# Patient Record
Sex: MALE | Race: WHITE | NOT HISPANIC OR LATINO | Employment: OTHER | URBAN - METROPOLITAN AREA
[De-identification: names, ages, dates, MRNs, and addresses within clinical notes are randomized per-mention and may not be internally consistent; named-entity substitution may affect disease eponyms.]

---

## 2017-05-28 ENCOUNTER — HOSPITAL ENCOUNTER (INPATIENT)
Facility: HOSPITAL | Age: 77
LOS: 2 days | Discharge: HOME | DRG: 281 | End: 2017-05-30
Attending: EMERGENCY MEDICINE | Admitting: INTERNAL MEDICINE
Payer: MEDICARE

## 2017-05-28 DIAGNOSIS — I21.4 NSTEMI, INITIAL EPISODE OF CARE: Primary | ICD-10-CM

## 2017-05-28 DIAGNOSIS — R07.9 CHEST PAIN: ICD-10-CM

## 2017-05-28 DIAGNOSIS — I21.4 NSTEMI (NON-ST ELEVATED MYOCARDIAL INFARCTION): ICD-10-CM

## 2017-05-28 LAB
ALBUMIN SERPL BCP-MCNC: 3.5 G/DL
ALP SERPL-CCNC: 75 U/L
ALT SERPL W/O P-5'-P-CCNC: 34 U/L
ANION GAP SERPL CALC-SCNC: 12 MMOL/L
APTT BLDCRRT: 24.7 SEC
AST SERPL-CCNC: 37 U/L
BASOPHILS # BLD AUTO: 0.01 K/UL
BASOPHILS # BLD AUTO: 0.03 K/UL
BASOPHILS NFR BLD: 0.1 %
BASOPHILS NFR BLD: 0.3 %
BILIRUB SERPL-MCNC: 0.5 MG/DL
BNP SERPL-MCNC: <10 PG/ML
BUN SERPL-MCNC: 27 MG/DL
CALCIUM SERPL-MCNC: 9.4 MG/DL
CHLORIDE SERPL-SCNC: 100 MMOL/L
CO2 SERPL-SCNC: 28 MMOL/L
CREAT SERPL-MCNC: 1.4 MG/DL
DIFFERENTIAL METHOD: ABNORMAL
DIFFERENTIAL METHOD: NORMAL
EOSINOPHIL # BLD AUTO: 0.2 K/UL
EOSINOPHIL # BLD AUTO: 0.4 K/UL
EOSINOPHIL NFR BLD: 1.6 %
EOSINOPHIL NFR BLD: 3.9 %
ERYTHROCYTE [DISTWIDTH] IN BLOOD BY AUTOMATED COUNT: 13.9 %
ERYTHROCYTE [DISTWIDTH] IN BLOOD BY AUTOMATED COUNT: 13.9 %
EST. GFR  (AFRICAN AMERICAN): 56.8 ML/MIN/1.73 M^2
EST. GFR  (NON AFRICAN AMERICAN): 49.1 ML/MIN/1.73 M^2
GLUCOSE SERPL-MCNC: 113 MG/DL
HCT VFR BLD AUTO: 46 %
HCT VFR BLD AUTO: 48.7 %
HGB BLD-MCNC: 15.7 G/DL
HGB BLD-MCNC: 17 G/DL
LYMPHOCYTES # BLD AUTO: 2.1 K/UL
LYMPHOCYTES # BLD AUTO: 3.6 K/UL
LYMPHOCYTES NFR BLD: 20 %
LYMPHOCYTES NFR BLD: 36 %
MCH RBC QN AUTO: 30.4 PG
MCH RBC QN AUTO: 30.8 PG
MCHC RBC AUTO-ENTMCNC: 34.1 %
MCHC RBC AUTO-ENTMCNC: 34.9 %
MCV RBC AUTO: 88 FL
MCV RBC AUTO: 89 FL
MONOCYTES # BLD AUTO: 0.6 K/UL
MONOCYTES # BLD AUTO: 1.1 K/UL
MONOCYTES NFR BLD: 10.6 %
MONOCYTES NFR BLD: 5.4 %
NEUTROPHILS # BLD AUTO: 5 K/UL
NEUTROPHILS # BLD AUTO: 7.6 K/UL
NEUTROPHILS NFR BLD: 49 %
NEUTROPHILS NFR BLD: 72.7 %
PLATELET # BLD AUTO: 170 K/UL
PLATELET # BLD AUTO: 181 K/UL
PMV BLD AUTO: 10 FL
PMV BLD AUTO: 9.5 FL
POTASSIUM SERPL-SCNC: 3.4 MMOL/L
PROT SERPL-MCNC: 7.3 G/DL
RBC # BLD AUTO: 5.16 M/UL
RBC # BLD AUTO: 5.52 M/UL
SODIUM SERPL-SCNC: 140 MMOL/L
TROPONIN I SERPL DL<=0.01 NG/ML-MCNC: 0.03 NG/ML
TROPONIN I SERPL DL<=0.01 NG/ML-MCNC: 0.11 NG/ML
TROPONIN I SERPL DL<=0.01 NG/ML-MCNC: 0.22 NG/ML
WBC # BLD AUTO: 10.1 K/UL
WBC # BLD AUTO: 10.45 K/UL

## 2017-05-28 PROCEDURE — 96375 TX/PRO/DX INJ NEW DRUG ADDON: CPT

## 2017-05-28 PROCEDURE — 83880 ASSAY OF NATRIURETIC PEPTIDE: CPT

## 2017-05-28 PROCEDURE — 63600175 PHARM REV CODE 636 W HCPCS: Performed by: EMERGENCY MEDICINE

## 2017-05-28 PROCEDURE — 25000003 PHARM REV CODE 250

## 2017-05-28 PROCEDURE — 93010 ELECTROCARDIOGRAM REPORT: CPT | Mod: 77,ICN,, | Performed by: INTERNAL MEDICINE

## 2017-05-28 PROCEDURE — 25000003 PHARM REV CODE 250: Performed by: EMERGENCY MEDICINE

## 2017-05-28 PROCEDURE — 36415 COLL VENOUS BLD VENIPUNCTURE: CPT

## 2017-05-28 PROCEDURE — 93005 ELECTROCARDIOGRAM TRACING: CPT

## 2017-05-28 PROCEDURE — 99285 EMERGENCY DEPT VISIT HI MDM: CPT | Mod: ,,, | Performed by: EMERGENCY MEDICINE

## 2017-05-28 PROCEDURE — 20600001 HC STEP DOWN PRIVATE ROOM

## 2017-05-28 PROCEDURE — 96365 THER/PROPH/DIAG IV INF INIT: CPT

## 2017-05-28 PROCEDURE — 25000003 PHARM REV CODE 250: Performed by: INTERNAL MEDICINE

## 2017-05-28 PROCEDURE — 85730 THROMBOPLASTIN TIME PARTIAL: CPT

## 2017-05-28 PROCEDURE — 93010 ELECTROCARDIOGRAM REPORT: CPT | Mod: ,,, | Performed by: INTERNAL MEDICINE

## 2017-05-28 PROCEDURE — 84484 ASSAY OF TROPONIN QUANT: CPT | Mod: 91

## 2017-05-28 PROCEDURE — 80053 COMPREHEN METABOLIC PANEL: CPT

## 2017-05-28 PROCEDURE — 99285 EMERGENCY DEPT VISIT HI MDM: CPT | Mod: 25

## 2017-05-28 PROCEDURE — 85025 COMPLETE CBC W/AUTO DIFF WBC: CPT

## 2017-05-28 RX ORDER — CLOPIDOGREL BISULFATE 75 MG/1
75 TABLET ORAL DAILY
Status: DISCONTINUED | OUTPATIENT
Start: 2017-05-29 | End: 2017-05-30 | Stop reason: HOSPADM

## 2017-05-28 RX ORDER — CHLORTHALIDONE 25 MG/1
25 TABLET ORAL DAILY
COMMUNITY

## 2017-05-28 RX ORDER — PREGABALIN 100 MG/1
100 CAPSULE ORAL 2 TIMES DAILY
COMMUNITY

## 2017-05-28 RX ORDER — IRBESARTAN 300 MG/1
300 TABLET ORAL NIGHTLY
Status: DISCONTINUED | OUTPATIENT
Start: 2017-05-28 | End: 2017-05-30 | Stop reason: HOSPADM

## 2017-05-28 RX ORDER — ROSUVASTATIN CALCIUM 20 MG/1
20 TABLET, COATED ORAL DAILY
Status: ON HOLD | COMMUNITY
End: 2017-05-29

## 2017-05-28 RX ORDER — SODIUM CHLORIDE 0.9 % (FLUSH) 0.9 %
3 SYRINGE (ML) INJECTION EVERY 8 HOURS
Status: DISCONTINUED | OUTPATIENT
Start: 2017-05-28 | End: 2017-05-28

## 2017-05-28 RX ORDER — NITROGLYCERIN 0.4 MG/1
TABLET SUBLINGUAL
Status: COMPLETED
Start: 2017-05-28 | End: 2017-05-28

## 2017-05-28 RX ORDER — DULOXETIN HYDROCHLORIDE 60 MG/1
60 CAPSULE, DELAYED RELEASE ORAL DAILY
Status: DISCONTINUED | OUTPATIENT
Start: 2017-05-29 | End: 2017-05-30 | Stop reason: HOSPADM

## 2017-05-28 RX ORDER — CLOPIDOGREL BISULFATE 75 MG/1
75 TABLET ORAL DAILY
COMMUNITY

## 2017-05-28 RX ORDER — NITROGLYCERIN 0.4 MG/1
0.4 TABLET SUBLINGUAL EVERY 5 MIN PRN
Status: COMPLETED | OUTPATIENT
Start: 2017-05-28 | End: 2017-05-28

## 2017-05-28 RX ORDER — SODIUM CHLORIDE 0.9 % (FLUSH) 0.9 %
3 SYRINGE (ML) INJECTION EVERY 8 HOURS
Status: DISCONTINUED | OUTPATIENT
Start: 2017-05-28 | End: 2017-05-30 | Stop reason: HOSPADM

## 2017-05-28 RX ORDER — DULOXETIN HYDROCHLORIDE 60 MG/1
60 CAPSULE, DELAYED RELEASE ORAL DAILY
COMMUNITY

## 2017-05-28 RX ORDER — CHLORTHALIDONE 25 MG/1
25 TABLET ORAL DAILY
Status: DISCONTINUED | OUTPATIENT
Start: 2017-05-29 | End: 2017-05-30 | Stop reason: HOSPADM

## 2017-05-28 RX ORDER — ROSUVASTATIN CALCIUM 20 MG/1
40 TABLET, COATED ORAL DAILY
Status: DISCONTINUED | OUTPATIENT
Start: 2017-05-29 | End: 2017-05-30 | Stop reason: HOSPADM

## 2017-05-28 RX ORDER — POLYETHYLENE GLYCOL 3350 17 G/17G
17 POWDER, FOR SOLUTION ORAL DAILY PRN
Status: DISCONTINUED | OUTPATIENT
Start: 2017-05-28 | End: 2017-05-30 | Stop reason: HOSPADM

## 2017-05-28 RX ORDER — ONDANSETRON 2 MG/ML
4 INJECTION INTRAMUSCULAR; INTRAVENOUS EVERY 6 HOURS PRN
Status: DISCONTINUED | OUTPATIENT
Start: 2017-05-28 | End: 2017-05-30 | Stop reason: HOSPADM

## 2017-05-28 RX ORDER — METOPROLOL TARTRATE 25 MG/1
12.5 TABLET ORAL EVERY 6 HOURS
Status: DISCONTINUED | OUTPATIENT
Start: 2017-05-28 | End: 2017-05-30 | Stop reason: HOSPADM

## 2017-05-28 RX ORDER — HEPARIN SODIUM,PORCINE/D5W 25000/250
15 INTRAVENOUS SOLUTION INTRAVENOUS CONTINUOUS
Status: DISCONTINUED | OUTPATIENT
Start: 2017-05-28 | End: 2017-05-30 | Stop reason: HOSPADM

## 2017-05-28 RX ORDER — CELECOXIB 200 MG/1
200 CAPSULE ORAL 2 TIMES DAILY
COMMUNITY

## 2017-05-28 RX ORDER — LANSOPRAZOLE 30 MG/1
30 CAPSULE, DELAYED RELEASE ORAL DAILY
COMMUNITY

## 2017-05-28 RX ORDER — BUPROPION HYDROCHLORIDE 150 MG/1
150 TABLET, EXTENDED RELEASE ORAL 2 TIMES DAILY
COMMUNITY

## 2017-05-28 RX ORDER — HYDROMORPHONE HYDROCHLORIDE 1 MG/ML
1 INJECTION, SOLUTION INTRAMUSCULAR; INTRAVENOUS; SUBCUTANEOUS
Status: COMPLETED | OUTPATIENT
Start: 2017-05-28 | End: 2017-05-28

## 2017-05-28 RX ORDER — ASPIRIN 81 MG/1
81 TABLET ORAL DAILY
Status: DISCONTINUED | OUTPATIENT
Start: 2017-05-29 | End: 2017-05-30 | Stop reason: HOSPADM

## 2017-05-28 RX ORDER — CARVEDILOL 6.25 MG/1
6.25 TABLET ORAL 2 TIMES DAILY WITH MEALS
COMMUNITY

## 2017-05-28 RX ORDER — ASPIRIN 325 MG
325 TABLET ORAL
Status: COMPLETED | OUTPATIENT
Start: 2017-05-28 | End: 2017-05-28

## 2017-05-28 RX ORDER — NITROGLYCERIN 0.4 MG/1
0.4 TABLET SUBLINGUAL EVERY 5 MIN PRN
Status: DISCONTINUED | OUTPATIENT
Start: 2017-05-28 | End: 2017-05-30 | Stop reason: HOSPADM

## 2017-05-28 RX ORDER — PANTOPRAZOLE SODIUM 40 MG/1
40 TABLET, DELAYED RELEASE ORAL DAILY
Status: DISCONTINUED | OUTPATIENT
Start: 2017-05-29 | End: 2017-05-30 | Stop reason: HOSPADM

## 2017-05-28 RX ORDER — ROSUVASTATIN CALCIUM 20 MG/1
20 TABLET, COATED ORAL DAILY
Status: DISCONTINUED | OUTPATIENT
Start: 2017-05-29 | End: 2017-05-28

## 2017-05-28 RX ORDER — LEVOTHYROXINE SODIUM 25 UG/1
50 TABLET ORAL
Status: DISCONTINUED | OUTPATIENT
Start: 2017-05-29 | End: 2017-05-30 | Stop reason: HOSPADM

## 2017-05-28 RX ORDER — BUPROPION HYDROCHLORIDE 150 MG/1
150 TABLET ORAL DAILY
Status: DISCONTINUED | OUTPATIENT
Start: 2017-05-29 | End: 2017-05-30 | Stop reason: HOSPADM

## 2017-05-28 RX ORDER — ACETAMINOPHEN 325 MG/1
650 TABLET ORAL EVERY 8 HOURS PRN
Status: DISCONTINUED | OUTPATIENT
Start: 2017-05-28 | End: 2017-05-30 | Stop reason: HOSPADM

## 2017-05-28 RX ORDER — CARVEDILOL 6.25 MG/1
6.25 TABLET ORAL 2 TIMES DAILY WITH MEALS
Status: DISCONTINUED | OUTPATIENT
Start: 2017-05-29 | End: 2017-05-28

## 2017-05-28 RX ORDER — PREGABALIN 100 MG/1
100 CAPSULE ORAL 2 TIMES DAILY
Status: DISCONTINUED | OUTPATIENT
Start: 2017-05-28 | End: 2017-05-30 | Stop reason: HOSPADM

## 2017-05-28 RX ORDER — IRBESARTAN 300 MG/1
300 TABLET ORAL NIGHTLY
COMMUNITY

## 2017-05-28 RX ORDER — ALFUZOSIN HYDROCHLORIDE 10 MG/1
10 TABLET, EXTENDED RELEASE ORAL
COMMUNITY

## 2017-05-28 RX ORDER — ALFUZOSIN HYDROCHLORIDE 10 MG/1
10 TABLET, EXTENDED RELEASE ORAL
Status: DISCONTINUED | OUTPATIENT
Start: 2017-05-29 | End: 2017-05-30 | Stop reason: HOSPADM

## 2017-05-28 RX ORDER — HYDROMORPHONE HYDROCHLORIDE 1 MG/ML
1 INJECTION, SOLUTION INTRAMUSCULAR; INTRAVENOUS; SUBCUTANEOUS EVERY 4 HOURS PRN
Status: DISCONTINUED | OUTPATIENT
Start: 2017-05-28 | End: 2017-05-30 | Stop reason: HOSPADM

## 2017-05-28 RX ORDER — POTASSIUM CHLORIDE 750 MG/1
10 CAPSULE, EXTENDED RELEASE ORAL DAILY
COMMUNITY

## 2017-05-28 RX ORDER — LEVOTHYROXINE SODIUM 50 UG/1
50 TABLET ORAL DAILY
COMMUNITY

## 2017-05-28 RX ADMIN — HEPARIN SODIUM AND DEXTROSE 15 UNITS/KG/HR: 10000; 5 INJECTION INTRAVENOUS at 09:05

## 2017-05-28 RX ADMIN — NITROGLYCERIN 0.4 MG: 0.4 TABLET SUBLINGUAL at 10:05

## 2017-05-28 RX ADMIN — NITROGLYCERIN 10 MG: 0.4 TABLET SUBLINGUAL at 07:05

## 2017-05-28 RX ADMIN — NITROGLYCERIN 0.4 MG: 0.4 TABLET SUBLINGUAL at 07:05

## 2017-05-28 RX ADMIN — Medication 12.5 MG: at 11:05

## 2017-05-28 RX ADMIN — PREGABALIN 100 MG: 100 CAPSULE ORAL at 11:05

## 2017-05-28 RX ADMIN — ASPIRIN 325 MG ORAL TABLET 325 MG: 325 PILL ORAL at 07:05

## 2017-05-28 RX ADMIN — HYDROMORPHONE HYDROCHLORIDE 1 MG: 1 INJECTION, SOLUTION INTRAMUSCULAR; INTRAVENOUS; SUBCUTANEOUS at 08:05

## 2017-05-28 RX ADMIN — ALUMINUM HYDROXIDE, MAGNESIUM HYDROXIDE, AND SIMETHICONE 50 ML: 200; 200; 20 SUSPENSION ORAL at 07:05

## 2017-05-28 RX ADMIN — Medication 3 ML: at 11:05

## 2017-05-28 RX ADMIN — IRBESARTAN 300 MG: 300 TABLET ORAL at 11:05

## 2017-05-28 NOTE — PROVIDER PROGRESS NOTES - EMERGENCY DEPT.
Encounter Date: 5/28/2017    ED Physician Progress Notes       SCRIBE NOTE: I, Benson Frank, am scribing for, and in the presence of,  Dr. Pederson.  Physician Statement: I, Dr. Pederson, personally performed the services described in this documentation as scribed by Benson Frank in my presence, and it is both accurate and complete.      EKG - STEMI Decision  Initial Reading: No STEMI present.

## 2017-05-28 NOTE — ED PROVIDER NOTES
Encounter Date: 5/28/2017       History     Chief Complaint   Patient presents with    Chest Pain     onset 45 minutes PTA- entire chest and back  with pain radiating to jaw.  Sweaty . sob . nauseated and dizzy - hx of   bypass       Review of patient's allergies indicates:   Allergen Reactions    Morphine      75 y/o M with PMHx as per below including CABG in 2005 who presented with CP radiating to jaw, associated with SOB and diaphoresis with sudden onset approximately 45 min PTA.  Sx's improved with sub lingual nitroglycerine x 2 in ED.  Pt states the pain is similar in nature to the CP he experienced prior to CABG in 2005.           Past Medical History:   Diagnosis Date    Coronary artery disease     GERD (gastroesophageal reflux disease)     Hypertension      Past Surgical History:   Procedure Laterality Date    CORONARY ARTERY BYPASS GRAFT      EYE SURGERY      HERNIA REPAIR      KNEE SURGERY      POSTERIOR FUSION LUMBAR SPINE W/ CORPECTOMY      SHOULDER SURGERY       History reviewed. No pertinent family history.  Social History   Substance Use Topics    Smoking status: Former Smoker    Smokeless tobacco: Not on file    Alcohol use No     Review of Systems   Constitutional: Negative for chills and fever.   HENT: Negative for congestion, postnasal drip, rhinorrhea, sinus pressure and sore throat.    Eyes: Negative for pain and redness.   Respiratory: Negative for apnea, cough, choking, chest tightness, shortness of breath, wheezing and stridor.    Cardiovascular: Positive for chest pain. Negative for palpitations and leg swelling.   Gastrointestinal: Negative for abdominal distention, abdominal pain, blood in stool, constipation, diarrhea, nausea and vomiting.   Genitourinary: Negative for dysuria, flank pain, hematuria and urgency.   Musculoskeletal: Negative for arthralgias and myalgias.   Skin: Negative for rash and wound.   Neurological: Negative for weakness, light-headedness and headaches.    Hematological: Does not bruise/bleed easily.   Psychiatric/Behavioral: Negative for agitation, behavioral problems, confusion and hallucinations. The patient is not nervous/anxious.        Physical Exam     Initial Vitals [05/28/17 1848]   BP Pulse Resp Temp SpO2   (!) 174/104 64 18 98.7 °F (37.1 °C) 97 %     Physical Exam    Nursing note and vitals reviewed.  Constitutional: He appears well-developed and well-nourished. He is not diaphoretic. No distress.   HENT:   Head: Normocephalic and atraumatic.   Right Ear: External ear normal.   Left Ear: External ear normal.   Mouth/Throat: Oropharynx is clear and moist.   Eyes: Right eye exhibits no discharge. Left eye exhibits no discharge. No scleral icterus.   Neck: No tracheal deviation present. No JVD present.   Cardiovascular: Normal rate, regular rhythm, normal heart sounds and intact distal pulses. Exam reveals no gallop and no friction rub.    No murmur heard.  Pulmonary/Chest: Breath sounds normal. No stridor. No respiratory distress. He has no wheezes. He has no rhonchi. He has no rales. He exhibits no tenderness.   Abdominal: Soft. He exhibits no distension. There is no tenderness. There is no rebound and no guarding.   Musculoskeletal: Normal range of motion. He exhibits no edema or tenderness.   Neurological: He is alert and oriented to person, place, and time. He has normal strength.   Skin: Skin is warm and dry. Capillary refill takes less than 2 seconds. No rash noted. No erythema.   Psychiatric: He has a normal mood and affect. His behavior is normal. Judgment and thought content normal.         ED Course   Procedures  Labs Reviewed   CBC W/ AUTO DIFFERENTIAL - Abnormal; Notable for the following:        Result Value    Mono # 1.1 (*)     All other components within normal limits   COMPREHENSIVE METABOLIC PANEL - Abnormal; Notable for the following:     Potassium 3.4 (*)     Glucose 113 (*)     BUN, Bld 27 (*)     eGFR if  56.8 (*)      eGFR if non  49.1 (*)     All other components within normal limits   TROPONIN I - Abnormal; Notable for the following:     Troponin I 0.029 (*)     All other components within normal limits   B-TYPE NATRIURETIC PEPTIDE   APTT   CBC W/ AUTO DIFFERENTIAL                   APC / Resident Notes:   Pt is A/O x 4, afebrile, non-toxic in appearance, in no acute respiratory distress with VSS.  Aspirin ordered immediately due to high level of concern for ACS.  EKG showed sinus bradycardia with no ischemic changes.  Initial troponin returned elevated so consulted to Cards and admitted for further evaluation and management due to high level of concern for ACS.  Pt remained clinically stable throughout his stay in the ED with no acute events.    Michael Palmer MD  PGY -  4  06/01/2017  6:30 PM                ED Course     Clinical Impression:   The primary encounter diagnosis was NSTEMI, initial episode of care. Diagnoses of Chest pain and NSTEMI (non-ST elevated myocardial infarction) were also pertinent to this visit.          Michael Palmer MD  Resident  06/01/17 5330       Michael Palmer MD  Resident  06/01/17 8724

## 2017-05-29 PROBLEM — K21.9 GERD (GASTROESOPHAGEAL REFLUX DISEASE): Status: ACTIVE | Noted: 2017-05-29

## 2017-05-29 PROBLEM — E03.9 HYPOTHYROIDISM: Status: ACTIVE | Noted: 2017-05-29

## 2017-05-29 PROBLEM — I10 HYPERTENSION: Status: ACTIVE | Noted: 2017-05-29

## 2017-05-29 PROBLEM — F39 MOOD DISORDER: Status: ACTIVE | Noted: 2017-05-29

## 2017-05-29 PROBLEM — R39.9 LOWER URINARY TRACT SYMPTOMS (LUTS): Status: ACTIVE | Noted: 2017-05-29

## 2017-05-29 PROBLEM — E87.6 HYPOKALEMIA: Status: ACTIVE | Noted: 2017-05-29

## 2017-05-29 PROBLEM — N17.9 AKI (ACUTE KIDNEY INJURY): Status: ACTIVE | Noted: 2017-05-29

## 2017-05-29 PROBLEM — G47.33 OSA ON CPAP: Status: ACTIVE | Noted: 2017-05-29

## 2017-05-29 PROBLEM — I21.4 NSTEMI (NON-ST ELEVATED MYOCARDIAL INFARCTION): Status: ACTIVE | Noted: 2017-05-29

## 2017-05-29 PROBLEM — N28.9 RENAL INSUFFICIENCY: Status: ACTIVE | Noted: 2017-05-29

## 2017-05-29 LAB
ALBUMIN SERPL BCP-MCNC: 3.4 G/DL
ALP SERPL-CCNC: 68 U/L
ALT SERPL W/O P-5'-P-CCNC: 46 U/L
ANION GAP SERPL CALC-SCNC: 9 MMOL/L
ANION GAP SERPL CALC-SCNC: 9 MMOL/L
AST SERPL-CCNC: 171 U/L
BASOPHILS # BLD AUTO: 0.02 K/UL
BASOPHILS NFR BLD: 0.2 %
BILIRUB SERPL-MCNC: 0.7 MG/DL
BUN SERPL-MCNC: 17 MG/DL
BUN SERPL-MCNC: 22 MG/DL
CALCIUM SERPL-MCNC: 8.9 MG/DL
CALCIUM SERPL-MCNC: 9 MG/DL
CHLORIDE SERPL-SCNC: 103 MMOL/L
CHLORIDE SERPL-SCNC: 97 MMOL/L
CHOLEST/HDLC SERPL: 4.4 {RATIO}
CO2 SERPL-SCNC: 24 MMOL/L
CO2 SERPL-SCNC: 30 MMOL/L
CREAT SERPL-MCNC: 1 MG/DL
CREAT SERPL-MCNC: 1 MG/DL
DIASTOLIC DYSFUNCTION: YES
DIFFERENTIAL METHOD: NORMAL
EOSINOPHIL # BLD AUTO: 0.3 K/UL
EOSINOPHIL NFR BLD: 2.5 %
ERYTHROCYTE [DISTWIDTH] IN BLOOD BY AUTOMATED COUNT: 14.1 %
EST. GFR  (AFRICAN AMERICAN): >60 ML/MIN/1.73 M^2
EST. GFR  (AFRICAN AMERICAN): >60 ML/MIN/1.73 M^2
EST. GFR  (NON AFRICAN AMERICAN): >60 ML/MIN/1.73 M^2
EST. GFR  (NON AFRICAN AMERICAN): >60 ML/MIN/1.73 M^2
ESTIMATED PA SYSTOLIC PRESSURE: 24.21
FACT X PPP CHRO-ACNC: 0.45 IU/ML
GLUCOSE SERPL-MCNC: 107 MG/DL
GLUCOSE SERPL-MCNC: 117 MG/DL (ref 70–110)
GLUCOSE SERPL-MCNC: 122 MG/DL
HCO3 UR-SCNC: 28.3 MMOL/L (ref 24–28)
HCT VFR BLD AUTO: 47.8 %
HCT VFR BLD CALC: 50 %PCV (ref 36–54)
HDL/CHOLESTEROL RATIO: 22.9 %
HDLC SERPL-MCNC: 118 MG/DL
HDLC SERPL-MCNC: 27 MG/DL
HGB BLD-MCNC: 16.2 G/DL
LDLC SERPL CALC-MCNC: 73.8 MG/DL
LYMPHOCYTES # BLD AUTO: 2.8 K/UL
LYMPHOCYTES NFR BLD: 25.2 %
MAGNESIUM SERPL-MCNC: 2 MG/DL
MCH RBC QN AUTO: 30.3 PG
MCHC RBC AUTO-ENTMCNC: 33.9 %
MCV RBC AUTO: 89 FL
MITRAL VALVE REGURGITATION: ABNORMAL
MONOCYTES # BLD AUTO: 0.9 K/UL
MONOCYTES NFR BLD: 8.2 %
NEUTROPHILS # BLD AUTO: 7.2 K/UL
NEUTROPHILS NFR BLD: 63.7 %
NONHDLC SERPL-MCNC: 91 MG/DL
PCO2 BLDA: 45.3 MMHG (ref 35–45)
PH SMN: 7.4 [PH] (ref 7.35–7.45)
PLATELET # BLD AUTO: 178 K/UL
PMV BLD AUTO: 10.2 FL
PO2 BLDA: 53 MMHG (ref 40–60)
POC BE: 4 MMOL/L
POC IONIZED CALCIUM: 1.15 MMOL/L (ref 1.06–1.42)
POC SATURATED O2: 87 % (ref 95–100)
POC TCO2: 30 MMOL/L (ref 24–29)
POTASSIUM BLD-SCNC: 2.9 MMOL/L (ref 3.5–5.1)
POTASSIUM SERPL-SCNC: 2.8 MMOL/L
POTASSIUM SERPL-SCNC: 4.1 MMOL/L
PROT SERPL-MCNC: 6.8 G/DL
RBC # BLD AUTO: 5.35 M/UL
RETIRED EF AND QEF - SEE NOTES: 45 (ref 55–65)
SAMPLE: ABNORMAL
SODIUM BLD-SCNC: 139 MMOL/L (ref 136–145)
SODIUM SERPL-SCNC: 136 MMOL/L
SODIUM SERPL-SCNC: 136 MMOL/L
TRICUSPID VALVE REGURGITATION: ABNORMAL
TRIGL SERPL-MCNC: 86 MG/DL
TROPONIN I SERPL DL<=0.01 NG/ML-MCNC: 8.38 NG/ML
WBC # BLD AUTO: 11.22 K/UL

## 2017-05-29 PROCEDURE — 25000003 PHARM REV CODE 250: Performed by: EMERGENCY MEDICINE

## 2017-05-29 PROCEDURE — 20600001 HC STEP DOWN PRIVATE ROOM

## 2017-05-29 PROCEDURE — 80061 LIPID PANEL: CPT

## 2017-05-29 PROCEDURE — 85025 COMPLETE CBC W/AUTO DIFF WBC: CPT

## 2017-05-29 PROCEDURE — 25000003 PHARM REV CODE 250: Performed by: INTERNAL MEDICINE

## 2017-05-29 PROCEDURE — 84484 ASSAY OF TROPONIN QUANT: CPT

## 2017-05-29 PROCEDURE — 80053 COMPREHEN METABOLIC PANEL: CPT

## 2017-05-29 PROCEDURE — 36415 COLL VENOUS BLD VENIPUNCTURE: CPT

## 2017-05-29 PROCEDURE — 27000190 HC CPAP FULL FACE MASK W/VALVE

## 2017-05-29 PROCEDURE — 27000221 HC OXYGEN, UP TO 24 HOURS

## 2017-05-29 PROCEDURE — 83735 ASSAY OF MAGNESIUM: CPT

## 2017-05-29 PROCEDURE — C1760 CLOSURE DEV, VASC: HCPCS

## 2017-05-29 PROCEDURE — 93459 L HRT ART/GRFT ANGIO: CPT | Mod: 26,,, | Performed by: INTERNAL MEDICINE

## 2017-05-29 PROCEDURE — C1769 GUIDE WIRE: HCPCS

## 2017-05-29 PROCEDURE — 99152 MOD SED SAME PHYS/QHP 5/>YRS: CPT | Mod: ,,, | Performed by: INTERNAL MEDICINE

## 2017-05-29 PROCEDURE — 93306 TTE W/DOPPLER COMPLETE: CPT

## 2017-05-29 PROCEDURE — 99233 SBSQ HOSP IP/OBS HIGH 50: CPT | Mod: ,,, | Performed by: INTERNAL MEDICINE

## 2017-05-29 PROCEDURE — 93306 TTE W/DOPPLER COMPLETE: CPT | Mod: 26,,, | Performed by: INTERNAL MEDICINE

## 2017-05-29 PROCEDURE — 63600175 PHARM REV CODE 636 W HCPCS

## 2017-05-29 PROCEDURE — B2111ZZ FLUOROSCOPY OF MULTIPLE CORONARY ARTERIES USING LOW OSMOLAR CONTRAST: ICD-10-PCS | Performed by: INTERNAL MEDICINE

## 2017-05-29 PROCEDURE — 99222 1ST HOSP IP/OBS MODERATE 55: CPT | Mod: ,,, | Performed by: INTERNAL MEDICINE

## 2017-05-29 PROCEDURE — 99900035 HC TECH TIME PER 15 MIN (STAT)

## 2017-05-29 PROCEDURE — 94660 CPAP INITIATION&MGMT: CPT

## 2017-05-29 PROCEDURE — 85520 HEPARIN ASSAY: CPT

## 2017-05-29 PROCEDURE — 80048 BASIC METABOLIC PNL TOTAL CA: CPT

## 2017-05-29 PROCEDURE — 25000003 PHARM REV CODE 250

## 2017-05-29 RX ORDER — ROSUVASTATIN CALCIUM 40 MG/1
40 TABLET, COATED ORAL DAILY
Qty: 30 TABLET | Refills: 0 | Status: SHIPPED | OUTPATIENT
Start: 2017-05-29

## 2017-05-29 RX ORDER — POTASSIUM CHLORIDE 20 MEQ/15ML
80 SOLUTION ORAL ONCE
Status: COMPLETED | OUTPATIENT
Start: 2017-05-29 | End: 2017-05-29

## 2017-05-29 RX ORDER — NITROGLYCERIN 0.4 MG/1
0.4 TABLET SUBLINGUAL EVERY 5 MIN PRN
Qty: 30 TABLET | Refills: 0 | Status: SHIPPED | OUTPATIENT
Start: 2017-05-29 | End: 2018-05-29

## 2017-05-29 RX ORDER — POTASSIUM CHLORIDE 20 MEQ/15ML
20 SOLUTION ORAL ONCE
Status: COMPLETED | OUTPATIENT
Start: 2017-05-29 | End: 2017-05-29

## 2017-05-29 RX ORDER — ASPIRIN 81 MG/1
81 TABLET ORAL DAILY
Refills: 0 | COMMUNITY
Start: 2017-05-29 | End: 2018-05-29

## 2017-05-29 RX ORDER — SODIUM CHLORIDE 9 MG/ML
3 INJECTION, SOLUTION INTRAVENOUS CONTINUOUS
Status: ACTIVE | OUTPATIENT
Start: 2017-05-29 | End: 2017-05-29

## 2017-05-29 RX ORDER — POTASSIUM CHLORIDE 20 MEQ/15ML
60 SOLUTION ORAL DAILY
Status: DISCONTINUED | OUTPATIENT
Start: 2017-05-29 | End: 2017-05-30 | Stop reason: HOSPADM

## 2017-05-29 RX ORDER — POTASSIUM CHLORIDE 7.45 MG/ML
20 INJECTION INTRAVENOUS ONCE
Status: DISCONTINUED | OUTPATIENT
Start: 2017-05-29 | End: 2017-05-29

## 2017-05-29 RX ADMIN — ASPIRIN 81 MG: 81 TABLET, COATED ORAL at 08:05

## 2017-05-29 RX ADMIN — POTASSIUM CHLORIDE 20 MEQ: 20 SOLUTION ORAL at 02:05

## 2017-05-29 RX ADMIN — Medication 12.5 MG: at 08:05

## 2017-05-29 RX ADMIN — Medication 12.5 MG: at 02:05

## 2017-05-29 RX ADMIN — POTASSIUM CHLORIDE 80 MEQ: 1.5 SOLUTION ORAL at 11:05

## 2017-05-29 RX ADMIN — LEVOTHYROXINE SODIUM 50 MCG: 25 TABLET ORAL at 06:05

## 2017-05-29 RX ADMIN — SODIUM CHLORIDE 3 ML/KG/HR: 0.9 INJECTION, SOLUTION INTRAVENOUS at 03:05

## 2017-05-29 RX ADMIN — HEPARIN SODIUM AND DEXTROSE 15 UNITS/KG/HR: 10000; 5 INJECTION INTRAVENOUS at 08:05

## 2017-05-29 RX ADMIN — CLOPIDOGREL 75 MG: 75 TABLET, FILM COATED ORAL at 08:05

## 2017-05-29 RX ADMIN — PREGABALIN 100 MG: 100 CAPSULE ORAL at 08:05

## 2017-05-29 RX ADMIN — ROSUVASTATIN CALCIUM 40 MG: 20 TABLET, FILM COATED ORAL at 08:05

## 2017-05-29 RX ADMIN — PANTOPRAZOLE SODIUM 40 MG: 40 TABLET, DELAYED RELEASE ORAL at 08:05

## 2017-05-29 RX ADMIN — IRBESARTAN 300 MG: 300 TABLET ORAL at 08:05

## 2017-05-29 RX ADMIN — POTASSIUM CHLORIDE 60 MEQ: 20 SOLUTION ORAL at 02:05

## 2017-05-29 RX ADMIN — BUPROPION HYDROCHLORIDE 150 MG: 150 TABLET, FILM COATED, EXTENDED RELEASE ORAL at 08:05

## 2017-05-29 RX ADMIN — Medication 3 ML: at 06:05

## 2017-05-29 RX ADMIN — DULOXETINE 60 MG: 60 CAPSULE, DELAYED RELEASE ORAL at 08:05

## 2017-05-29 RX ADMIN — ALFUZOSIN HYDROCHLORIDE 10 MG: 10 TABLET ORAL at 08:05

## 2017-05-29 RX ADMIN — CHLORTHALIDONE 25 MG: 25 TABLET ORAL at 08:05

## 2017-05-29 NOTE — ASSESSMENT & PLAN NOTE
No active chest pain or SOB. To undergo [facility].  1. Aspirin 81 mg daily.  2. Plavix 75 mg daily.  3. Rosuvastatin 40 mg daily.  4. Heparin drip pending [facility].  5. To [facility] today.

## 2017-05-29 NOTE — CONSULTS
[facility]  Cardiology  Consult Note    Patient Name: Malcom Mai  MRN: [id number]  Admission Date: 5/28/2017  Hospital Length of Stay: 0 days  Code Status: No Order   Attending Provider: Artem Pederson MD   Consulting Provider: Javy Draper MD  Primary Care Physician: [place], [place]  Principal Problem:<principal problem not specified>    Patient information was obtained from patient and spouse/SO.     Inpatient consult to Cardiology  Consult performed by: JAVY GARCIA  Consult ordered by: ANANT CORNELIUS           Subjective:     Chief Complaint:  Angina    HPI: 76 year old male patient with CAD( CABG x 4 in 2005 Dr Yip , [facility]), HTN, HLD, GERD, JOSE JUAN on CPAP presents to ED with sudden onset of chest pain 1 hour prior to presentation. Substernal radiating to jaw, associated with SOB and diaphoresis. Symptoms improved with sub lingual nitroglycerine x 2 in ED. He reports that these symptoms were similar to his presentation in 2005 that led to an angiogram and CABG subsequently. Patient does not remember any details of the procedure had a nuclear stress test 6 years ago that was reported as normal. He lives in [place], [place] and visiting his duaghter who lives in [place].     Past Medical History:   Diagnosis Date    Coronary artery disease     GERD (gastroesophageal reflux disease)     Hypertension        Past Surgical History:   Procedure Laterality Date    CORONARY ARTERY BYPASS GRAFT      EYE SURGERY      HERNIA REPAIR      KNEE SURGERY      POSTERIOR FUSION LUMBAR SPINE W/ CORPECTOMY      SHOULDER SURGERY         Review of patient's allergies indicates:   Allergen Reactions    Morphine        No current facility-administered medications on file prior to encounter.      No current outpatient prescriptions on file prior to encounter.     Family History     None        Social History Main Topics    Smoking status: Former Smoker     Smokeless tobacco: Not on file    Alcohol use No    Drug use: No    Sexual activity: Yes     Partners: Female     Review of Systems   Constitution: Positive for diaphoresis.   HENT: Negative for congestion and headaches.    Eyes: Negative for blurred vision and visual disturbance.   Cardiovascular: Positive for chest pain.   Respiratory: Positive for shortness of breath. Negative for sleep disturbances due to breathing.    Endocrine: Negative.    Skin: Negative.    Musculoskeletal: Negative for arthritis and neck pain.   Gastrointestinal: Positive for heartburn. Negative for abdominal pain.   Genitourinary: Negative.    Neurological: Negative for dizziness.   Psychiatric/Behavioral: Negative for depression. The patient is not nervous/anxious.      Objective:     Vital Signs (Most Recent):  Temp: 98.7 °F (37.1 °C) (05/28/17 1848)  Pulse: (!) 55 (05/28/17 1947)  Resp: (!) 21 (05/28/17 1947)  BP: 130/79 (05/28/17 1947)  SpO2: (!) 94 % (05/28/17 1947) Vital Signs (24h Range):  Temp:  [98.7 °F (37.1 °C)] 98.7 °F (37.1 °C)  Pulse:  [55-64] 55  Resp:  [15-25] 21  SpO2:  [93 %-97 %] 94 %  BP: (123-174)/() 130/79     Weight: 88.5 kg (195 lb)  Body mass index is 27.2 kg/m².    SpO2: (!) 94 %  O2 Device (Oxygen Therapy): room air    No intake or output data in the 24 hours ending 05/28/17 2049    Lines/Drains/Airways     Peripheral Intravenous Line                 Peripheral IV - Single Lumen 05/28/17 1900 Right Antecubital less than 1 day                Physical Exam   Constitutional: He is oriented to person, place, and time. He appears well-developed and well-nourished.   HENT:   Head: Normocephalic and atraumatic.   Eyes: Conjunctivae are normal. Pupils are equal, round, and reactive to light.   Neck: Normal range of motion. Neck supple.   Cardiovascular: Normal rate and regular rhythm.    Pulmonary/Chest: Effort normal and breath sounds normal.   Abdominal: Soft. Bowel sounds are normal.   Musculoskeletal: Normal  range of motion. He exhibits no edema.   Neurological: He is alert and oriented to person, place, and time.   Skin: Skin is warm and dry. No erythema.   Psychiatric: He has a normal mood and affect.       Significant Labs:   CMP   Recent Labs  Lab 05/28/17  1900      K 3.4*      CO2 28   *   BUN 27*   CREATININE 1.4   CALCIUM 9.4   PROT 7.3   ALBUMIN 3.5   BILITOT 0.5   ALKPHOS 75   AST 37   ALT 34   ANIONGAP 12   ESTGFRAFRICA 56.8*   EGFRNONAA 49.1*    and CBC   Recent Labs  Lab 05/28/17  1900   WBC 10.10   HGB 17.0   HCT 48.7            Assessment and Plan:   ACS : Unstable Angina/ NSTEMI. Patient has anginal symptoms similar to pre CABG in 2005. Initial troponin mildly elevated. He has been on DAPT with Plavix since 2005. Add heparin gtt. Continue DAPT/statin/beta blockers. Admit to IM-C. Patient symptom free at the time of my exam. If refractory - consider nitroglycerine infusion.  Interventional cardiology consult in AM    HTN : Continue ARB, beta blockers, chorthalidone.     Thank you for the consult.    Javy Draper MD  Cardiology   [facility]

## 2017-05-29 NOTE — PROGRESS NOTES
Pt heart rate 60; notified Dr. Braden Feliciano. Telephone order to administer metoprolol at 20:00 instead of 16:00 since 12:00 dose was given when pt returned from [facility] at 14:30. Metoprolol is prescribed q6h. Will continue to monitor.

## 2017-05-29 NOTE — ASSESSMENT & PLAN NOTE
Well controlled.   1. Chlorthalidone 25 mg daily.  2. Irbesartan 300 mg daily.  3. Lopressor 12.5 mg every 6 hours.  4. Monitor blood pressure and titrate therapy as needed.

## 2017-05-29 NOTE — ASSESSMENT & PLAN NOTE
- unclear baseline sCr  - obtain medical records from [place]  - rosa-procedural CIAKI precautions per INV cardiology

## 2017-05-29 NOTE — NURSING TRANSFER
Nursing Transfer Note      5/29/2017     Transfer To: [facility]    Transfer via stretcher    Transfer with cardiac monitoring    Transported by transport    Medicines sent: yes    Chart send with patient: Yes    Notified: spouse

## 2017-05-29 NOTE — ED TRIAGE NOTES
76 year old male pt presents to the ed with  Complaints of  Chest pain x 45 that started while watching tv. Pt denies any sob but complains of nausea. Pt denies any new numbness to his hands and feet. Pt is awake alert and oriented x 4. Pt does states he has some heart hx and presents to the ed with wife.

## 2017-05-29 NOTE — PROGRESS NOTES
Pt did not receive benadryl or have fluids started prior to leaving for [facility] because orders did not show; notified cath lab. Heparin stopped prior to procedure. Will continue to monitor.

## 2017-05-29 NOTE — CONSULTS
Interventional Cardiology Consult           Referring Physician:  Cinthia Feliciano MD  Indication: NSTEMI    HPI:     76 year old male patient with CAD( CABG x 4 in 2005 Dr Yip , [facility]), HTN, HLD, GERD, JOSE JUAN on CPAP presents to ED with sudden onset of chest pain 1 hour prior to presentation. Substernal radiating to jaw, associated with SOB and diaphoresis. Symptoms improved with sub lingual nitroglycerine x 2 in ED. He reports that these symptoms were similar to his presentation in 2005 that led to an angiogram and CABG subsequently. Patient does not remember any details of the procedure had a nuclear stress test 6 years ago that was reported as normal. He lives in [place], [place] and visiting his duaghter who lives in [place]      Past Medical History:   Diagnosis Date    Coronary artery disease     GERD (gastroesophageal reflux disease)     Hypertension      Past Surgical History:   Procedure Laterality Date    CORONARY ARTERY BYPASS GRAFT      EYE SURGERY      HERNIA REPAIR      KNEE SURGERY      POSTERIOR FUSION LUMBAR SPINE W/ CORPECTOMY      SHOULDER SURGERY       History reviewed. No pertinent family history.  Social History   Substance Use Topics    Smoking status: Former Smoker    Smokeless tobacco: Not on file    Alcohol use No     Review of patient's allergies indicates:   Allergen Reactions    Morphine       alfuzosin  10 mg Oral Daily with breakfast    aspirin  81 mg Oral Daily    buPROPion  150 mg Oral Daily    chlorthalidone  25 mg Oral Daily    clopidogrel  75 mg Oral Daily    duloxetine  60 mg Oral Daily    irbesartan  300 mg Oral QHS    levothyroxine  50 mcg Oral Before breakfast    metoprolol tartrate  12.5 mg Oral Q6H    pantoprazole  40 mg Oral Daily    pregabalin  100 mg Oral BID    rosuvastatin  40 mg Oral Daily    sodium chloride 0.9%  3 mL Intravenous Q8H       Review of Systems -   Constitution: Positive for diaphoresis.   HENT: Negative for  "congestion and headaches.    Eyes: Negative for blurred vision and visual disturbance.   Cardiovascular: Positive for chest pain.   Respiratory: Positive for shortness of breath. Negative for sleep disturbances due to breathing.    Endocrine: Negative.    Skin: Negative.    Musculoskeletal: Negative for arthritis and neck pain.   Gastrointestinal: Positive for heartburn. Negative for abdominal pain.   Genitourinary: Negative.    Neurological: Negative for dizziness.   Psychiatric/Behavioral: Negative for depression. The patient is not nervous/anxious  OBJECTIVE:     Vitals:    05/28/17 2202 05/28/17 2206 05/28/17 2245 05/28/17 2305   BP: (!) 141/82  (!) 150/93 100/62   Pulse:   71 75   Resp:  17 15 19   Temp:       TempSrc:       SpO2: 95%  95% (!) 92%   Weight:   86.9 kg (191 lb 9.3 oz)    Height:   5' 11" (1.803 m)          Constitutional: He is oriented to person, place, and time. He appears well-developed and well-nourished.   HENT:   Head: Normocephalic and atraumatic.   Eyes: Conjunctivae are normal. Pupils are equal, round, and reactive to light.   Neck: Normal range of motion. Neck supple.   Cardiovascular: Normal rate and regular rhythm.    Pulmonary/Chest: Effort normal and breath sounds normal.   Abdominal: Soft. Bowel sounds are normal.   Musculoskeletal: Normal range of motion. He exhibits no edema.   Neurological: He is alert and oriented to person, place, and time.   Skin: Skin is warm and dry. No erythema.   Psychiatric: He has a normal mood and affect.   Vascular:   LEFT RIGHT   RADIAL 2+ 2+   ULNAR 2+ 2+   BRACHIAL 2+ 2+   FEMORAL 2+ 2+   DP 2+ 2+   TP 2+ 2+     LABS:     CBC with Diff:     Recent Labs  Lab 05/28/17 1900 05/28/17 2148   WBC 10.10 10.45   HGB 17.0 15.7   HCT 48.7 46.0    170   LYMPH 36.0  3.6 20.0  2.1   MONO 10.6  1.1* 5.4  0.6   EOSINOPHIL 3.9 1.6       COAG:    Recent Labs  Lab 05/28/17 2148   APTT 24.7       CMP:   Recent Labs  Lab 05/28/17 1900   * "   CALCIUM 9.4   ALBUMIN 3.5   PROT 7.3      K 3.4*   CO2 28      BUN 27*   CREATININE 1.4   ALKPHOS 75   ALT 34   AST 37   BILITOT 0.5     Estimated Creatinine Clearance: 49.3 mL/min (based on Cr of 1.4).    .  Recent Labs  Lab 05/28/17  1900 05/28/17  2148 05/28/17  2311   TROPONINI 0.029* 0.111* 0.224*   BNP <10  --   --        IMAGING:   Telemetry:  NSR    EKGs: NSR, LAD, non specific ST-T changes    CXR: no acute findings    TTE: pending    Prior Ischemic Evaluation:  6 year sago at [place], [place] nuclear stress test  Had LHc and CABG in 2005 at [facility]   Medical records awaited.    PROCEDURAL RISK STRATIFICATION:     RAY Score: 6 with 41% risk at 14 days of: all-cause mortality, new or recurrent MI, or severe recurrent ischemia requiring urgent revascularization.    Stirum Predicted PCI Mortality Risk: 1.6    Stirum Predicted MACE Risk: 3.5    Crusade Bleeding Score & Risk: 25 low risk 5.7% risk of major in hospital bleeding    Contrast Nephropathy Post-PCI/Renal Risk Score: 7.5%   Prior Contrast Allergy: no    Sedation   Prior Sedation: yes   History of Obstructive Sleep Apnea/SOB: yes    Pertinent Allergies:   Latex: no   ASA: no   Heparin-Induced Thrombocytopenia: no    IMPRESSION / PLAN:     76 year old male patient with prior CABG in 2005 presenting with NSTEMI. On ACS Rx with DAPT( ASA/Plavix) , heparin infusion    The risks, benefits, and alternatives of coronary vascular angiography and possible intervention were discussed with pt. All questions were answered and informed consent was obtained. I had a detailed discussion with the patient regarding risk of stroke, MI, bleeding access site complications, renal failure, emergent need for heart surgery, acute limb complications including ischemia and loss, contrast allergy and death. Pt understands the risks and benefits of the procedure and wishes to proceed. If stents are needed and there is preference for PERRI, pt understands that would  necessitate aspirin for life with plavix for at least 1 year. Additionally, pt is aware that non compliance is likely to result in stent clotting with heart attack, heart failure, and/or death.     Right CFA access, JR/JL cath 4Fr, PERRI candidate.    Javy Draper MD  Cardiovascular Disease Fellow  PGY - V  Pager: [phone]

## 2017-05-29 NOTE — ASSESSMENT & PLAN NOTE
Patient with creatinine of 1.4 which down trended to 1. No other laboratory work up in our system to compare. Most consistent with MARY due to hypoperfusion from NSTEMI.  1. Monitor creatinine.  2. Avoid nephrotoxic agents.   3. Renally dose medications.

## 2017-05-29 NOTE — PROGRESS NOTES
"Interventional Cardiology   Post Cath Note      Referring Physician: Cinthia Feliciano MD  Procedure: [facility]  Indication: NSTEMI    SUBJECTIVE:   76 year old male patient with CAD( CABG x 4 in 2005 Dr Yip , [facility]), HTN, HLD, GERD, JOSE JUAN on CPAP presents to ED with sudden onset of chest pain 1 hour prior to presentation- diagnosed with NSTEMI    Cath Results:   Access:  Right CFA    LVEDP=15mmHg.    Patent LIMA to LAD.    Patent SVG to PDA.    Occluded SVG to OM.    Occluded SVG to D1.    99% proximal LAD stenosis.     of ostial LCx.     of PDA.    Right to left collaterals from PLB to LCx.  See full cath report for further details    Intervention:   None  Closure device used: Mynx  Patient tolerated the procedure well, no complications    Post Cath Exam:   /84 (BP Location: Left arm, BP Method: Automatic)   Pulse 69   Temp 98.2 °F (36.8 °C) (Oral)   Resp 18   Ht 5' 11" (1.803 m)   Wt 86.9 kg (191 lb 9.3 oz)   SpO2 (!) 93%   BMI 26.72 kg/m²     No unusual pain, hematoma, thrill or bruit at vascular access site.  Distal pulse present without signs of ischemia.    Vascular:   LEFT RIGHT   FEMORAL 2+ 2+   DP 2+ 2+   TP 2+ 2+       Assessment / Plan:     1. Routine post-cath care.  2. Maximize medical secondary prevention of CAD and MI. Better Blood pressure control. Baseline BP at home in 170s.  3. Risk factor reduction.   4. EC ASA 81mg po qday  5. Plavix 75mg po qday X 1 year  6. If angina persists despite maximally tolerated GDMT, then evaluate for D1 PCI and LCx  PCI  7. Follow-up with primary cardiologist in 1 week    Javy Draper MD  Cardiovascular Disease Fellow  PGY - V  Pager: [phone]    5/29/2017 2:37 PM  "

## 2017-05-29 NOTE — HOSPITAL COURSE
Patient admitted to Hospital Medicine Team C. He was evaluated by Interventional Cardiology and was scheduled for C.

## 2017-05-29 NOTE — PLAN OF CARE
05/29/17 1109   Discharge Assessment   Assessment Type Discharge Planning Assessment   Confirmed/corrected address and phone number on facesheet? Yes   Assessment information obtained from? Caregiver;Medical Record   Expected Length of Stay (days) 2   Communicated expected length of stay with patient/caregiver yes   Prior to hospitilization cognitive status: Alert/Oriented   Prior to hospitalization functional status: Independent   Current cognitive status: Alert/Oriented   Current Functional Status: Independent   Arrived From home or self-care   Lives With spouse   Able to Return to Prior Arrangements yes   Is patient able to care for self after discharge? Yes   How many people do you have in your home that can help with your care after discharge? 1   Who are your caregiver(s) and their phone number(s)? wife,Edilia   Patient's perception of discharge disposition home or selfcare   Readmission Within The Last 30 Days no previous admission in last 30 days   Patient currently being followed by outpatient case management? No   Patient currently receives home health services? No   Does the patient currently use HME? No   Patient currently receives private duty nursing? No   Patient currently receives any other outside agency services? No   Equipment Currently Used at Home CPAP   Do you have any problems affording any of your prescribed medications? No   Is the patient taking medications as prescribed? yes   Do you have any financial concerns preventing you from receiving the healthcare you need? No   Does the patient have transportation to healthcare appointments? Yes   Transportation Available car;family or friend will provide   On Dialysis? No   Does the patient receive services at the Coumadin Clinic? No   Are there any open cases? No   Discharge Plan A Home with family   Patient/Family In Agreement With Plan yes   Admitted with NSTEMI. Pt off the floor for Echo. Information obtained from wife. Lives with wife in   [place]. Independent in his ADLs. Plan is to DC home. No DC needs identified.    PCP:  [facility]: Brendan Matthews MD  Address: [address] [place] [address] [address], [place] [address]   Phone: [phone]

## 2017-05-29 NOTE — H&P
"[facility]  Cardiology  History and Physical     Patient Name: Malcom Mai  MRN: [id number]  Admission Date: 5/28/2017  Code Status: Full Code   Attending Provider: Cinthia Feliciano MD   Primary Care Physician: Primary Doctor No  Principal Problem:<principal problem not specified>    Patient information was obtained from patient and ER records.     Subjective:     Chief Complaint:  CP     HPI:  The patient developed back pain around 5P while watching TV and twenty min later developed constant CP across the chest. He had a "cold sweat" and nausea. The patient denies vomiting and SOB with the pain. The pain radiated to both arms and shoulders as well as to the left jaw. Over the last 3 weeks, he has had CP "once or twice a week" that he describes as sharp. The pain lasted "probably less than a minute". He denies fever, hemoptysis and changes with breathing or body position. The patient is a non-smoker. He follows with a cardiologist in [place]. The patient reportedly had a stress test about 4 years ago that was normal. He had CABG x4 in 2006. The patient also has a h/o upper GI issues such that he gets dilation every 4 years.      Past Medical History:   Diagnosis Date    Coronary artery disease     GERD (gastroesophageal reflux disease)     Hypertension        Past Surgical History:   Procedure Laterality Date    CORONARY ARTERY BYPASS GRAFT      EYE SURGERY      HERNIA REPAIR      KNEE SURGERY      POSTERIOR FUSION LUMBAR SPINE W/ CORPECTOMY      SHOULDER SURGERY         Review of patient's allergies indicates:   Allergen Reactions    Morphine        No current facility-administered medications on file prior to encounter.      No current outpatient prescriptions on file prior to encounter.     Family History     None        Social History Main Topics    Smoking status: Former Smoker    Smokeless tobacco: Not on file    Alcohol use No    Drug use: No    Sexual activity: Yes "     Partners: Female     Review of Systems   Constitution: Negative for chills and fever.   HENT: Negative for ear discharge and headaches.    Eyes: Negative for pain and visual disturbance.   Cardiovascular: Positive for chest pain. Negative for dyspnea on exertion, irregular heartbeat, leg swelling, orthopnea, palpitations, paroxysmal nocturnal dyspnea and syncope.   Respiratory: Negative for hemoptysis, shortness of breath and wheezing.    Skin: Negative for rash and suspicious lesions.   Musculoskeletal: Negative for joint pain and muscle weakness.   Gastrointestinal: Positive for nausea. Negative for abdominal pain, diarrhea, hematemesis, hematochezia, melena and vomiting.   Genitourinary: Negative for dysuria and frequency.   Neurological: Negative for focal weakness and tremors.   Psychiatric/Behavioral: Negative for altered mental status, suicidal ideas and thoughts of violence.     Objective:     Vital Signs (Most Recent):  Temp: 96.5 °F (35.8 °C) (05/29/17 0600)  Pulse: (!) 57 (05/29/17 0515)  Resp: (!) 8 (05/29/17 0515)  BP: (!) 141/88 (05/29/17 0515)  SpO2: 95 % (05/29/17 0515) Vital Signs (24h Range):  Temp:  [96.5 °F (35.8 °C)-98.7 °F (37.1 °C)] 96.5 °F (35.8 °C)  Pulse:  [54-75] 57  Resp:  [8-25] 8  SpO2:  [92 %-97 %] 95 %  BP: (100-174)/() 141/88     Weight: 86.9 kg (191 lb 9.3 oz)  Body mass index is 26.72 kg/m².    SpO2: 95 %  O2 Device (Oxygen Therapy): CPAP    No intake or output data in the 24 hours ending 05/29/17 0610    Lines/Drains/Airways     Peripheral Intravenous Line                 Peripheral IV - Single Lumen 05/28/17 1900 Right Antecubital less than 1 day         Peripheral IV - Single Lumen 05/28/17 2137 Left Forearm less than 1 day                Physical Exam   Constitutional: He is oriented to person, place, and time. He appears well-developed.   HENT:   Head: Normocephalic and atraumatic.   Eyes: EOM are normal.   Neck: Normal range of motion.   Cardiovascular: Normal rate,  regular rhythm and normal heart sounds.  Exam reveals no gallop and no friction rub.    No murmur heard.  Pulses:       Radial pulses are 2+ on the right side, and 2+ on the left side.   Pulmonary/Chest: Effort normal. He has no wheezes. He has no rales.   Abdominal: Soft. Bowel sounds are normal. He exhibits no distension. There is no tenderness. There is no rebound.   Musculoskeletal: Normal range of motion. He exhibits no edema.   Neurological: He is alert and oriented to person, place, and time. He has normal strength. No cranial nerve deficit or sensory deficit.   Skin: Skin is warm.   Psychiatric: He has a normal mood and affect.       Significant Labs:   CMP   Recent Labs  Lab 05/28/17  1900      K 3.4*      CO2 28   *   BUN 27*   CREATININE 1.4   CALCIUM 9.4   PROT 7.3   ALBUMIN 3.5   BILITOT 0.5   ALKPHOS 75   AST 37   ALT 34   ANIONGAP 12   ESTGFRAFRICA 56.8*   EGFRNONAA 49.1*   , CBC   Recent Labs  Lab 05/28/17 1900 05/28/17 2148   WBC 10.10 10.45   HGB 17.0 15.7   HCT 48.7 46.0    170    and Troponin   Recent Labs  Lab 05/28/17 1900 05/28/17 2148 05/28/17  2311   TROPONINI 0.029* 0.111* 0.224*       Significant Imaging: Reviewed    Assessment and Plan:     NSTEMI (non-ST elevated myocardial infarction)    - aspirin loaded; continue maintenance dose ASA  - compliant with clopidogrel; continue maintenance dose  - IV UFH, irbesartan, metoprolol tartrate (goal HR 50-60)  - no e/o electric or hemodynamic instability  - no e/o recurrent/refractory CP  - increase rosuvastatin dose  - INV cardiology consult  - TTE with CFD  - SL NTG prn  - lipid panel  - trend Tn        Renal insufficiency    - unclear baseline sCr  - obtain medical records from [place]  - rosa-procedural CIAKI precautions per INV cardiology        Hypertension    - chlorthalidone, irbetsartan  - metoprolol tartrate in lieu of carvedilol        JOSE JUAN on CPAP    - CPAP qHS        GERD (gastroesophageal reflux disease)     - pantoprazole        Hypothyroidism    - LT4        Mood disorder    - bupropion, duloxetine        Lower urinary tract symptoms (LUTS)    - alfuzosin            VTE Risk Mitigation         Ordered     Low Risk of VTE  Once      05/28/17 2940          Lm Feng MD  Cardiology   [facility]

## 2017-05-29 NOTE — NURSING TRANSFER
Nursing Transfer Note      5/28/2017     Transfer From: ED    Transfer via stretcher    Transfer with cardiac monitoring,     Transported by RN    Medicines sent: heparin gtt    Chart send with patient: Yes    Notified: spouse, daughter    Upon arrival to floor: cardiac monitor & visi monitor applied, patient oriented to room, call bell in reach and bed in lowest position, ipad given to pt. CP 3/10 on arrival. Dr. Feng at bedside. Nitro X2 given. CP now resolved. BP stable. NADN. Will continue to monitor.

## 2017-05-29 NOTE — NURSING TRANSFER
Nursing Transfer Note      5/29/2017     Transfer From: echo    Transfer via stretcher    Transfer with cardiac monitoring    Transported by transport    Medicines sent: yes    Chart send with patient: No    Notified: spouse    Patient reassessed at: 10:36 am; 5/29    Upon arrival to floor: cardiac monitor applied, patient oriented to room, call bell in reach and bed in lowest position

## 2017-05-29 NOTE — ASSESSMENT & PLAN NOTE
- aspirin loaded; continue maintenance dose ASA  - compliant with clopidogrel; continue maintenance dose  - IV UFH, irbesartan, metoprolol tartrate (goal HR 50-60)  - no e/o electric or hemodynamic instability  - no e/o recurrent/refractory CP  - increase rosuvastatin dose  - INV cardiology consult  - TTE with CFD  - SL NTG prn  - lipid panel  - trend Tn

## 2017-05-29 NOTE — NURSING TRANSFER
Nursing Transfer Note      5/29/2017     Transfer From: [facility]    Transfer via stretcher    Transfer with cardiac monitoring    Transported by cath lab transport    Medicines sent: yes    Chart send with patient: Yes    Notified: spouse, daughter    Patient reassessed at: 1355; 5/29    Upon arrival to floor: cardiac monitor applied, patient oriented to room, call bell in reach and bed in lowest position

## 2017-05-29 NOTE — PLAN OF CARE
Hypokalemia resolved after replacement. Medically stable for discharge but his family allege that patient is weak and unable to get up and walk. Will cancel discharge and arrange for PT/OT to evaluate in the morning.  Cinthia Feliciano MD  [facility] Medicine Staff  [phone]

## 2017-05-29 NOTE — SUBJECTIVE & OBJECTIVE
Past Medical History:   Diagnosis Date    Coronary artery disease     GERD (gastroesophageal reflux disease)     Hypertension        Past Surgical History:   Procedure Laterality Date    CORONARY ARTERY BYPASS GRAFT      EYE SURGERY      HERNIA REPAIR      KNEE SURGERY      POSTERIOR FUSION LUMBAR SPINE W/ CORPECTOMY      SHOULDER SURGERY         Review of patient's allergies indicates:   Allergen Reactions    Morphine        No current facility-administered medications on file prior to encounter.      No current outpatient prescriptions on file prior to encounter.     Family History     None        Social History Main Topics    Smoking status: Former Smoker    Smokeless tobacco: Not on file    Alcohol use No    Drug use: No    Sexual activity: Yes     Partners: Female     Review of Systems   Constitution: Negative for chills and fever.   HENT: Negative for ear discharge and headaches.    Eyes: Negative for pain and visual disturbance.   Cardiovascular: Positive for chest pain. Negative for dyspnea on exertion, irregular heartbeat, leg swelling, orthopnea, palpitations, paroxysmal nocturnal dyspnea and syncope.   Respiratory: Negative for hemoptysis, shortness of breath and wheezing.    Skin: Negative for rash and suspicious lesions.   Musculoskeletal: Negative for joint pain and muscle weakness.   Gastrointestinal: Positive for nausea. Negative for abdominal pain, diarrhea, hematemesis, hematochezia, melena and vomiting.   Genitourinary: Negative for dysuria and frequency.   Neurological: Negative for focal weakness and tremors.   Psychiatric/Behavioral: Negative for altered mental status, suicidal ideas and thoughts of violence.     Objective:     Vital Signs (Most Recent):  Temp: 96.5 °F (35.8 °C) (05/29/17 0600)  Pulse: (!) 57 (05/29/17 0515)  Resp: (!) 8 (05/29/17 0515)  BP: (!) 141/88 (05/29/17 0515)  SpO2: 95 % (05/29/17 0515) Vital Signs (24h Range):  Temp:  [96.5 °F (35.8 °C)-98.7 °F (37.1  °C)] 96.5 °F (35.8 °C)  Pulse:  [54-75] 57  Resp:  [8-25] 8  SpO2:  [92 %-97 %] 95 %  BP: (100-174)/() 141/88     Weight: 86.9 kg (191 lb 9.3 oz)  Body mass index is 26.72 kg/m².    SpO2: 95 %  O2 Device (Oxygen Therapy): CPAP    No intake or output data in the 24 hours ending 05/29/17 0610    Lines/Drains/Airways     Peripheral Intravenous Line                 Peripheral IV - Single Lumen 05/28/17 1900 Right Antecubital less than 1 day         Peripheral IV - Single Lumen 05/28/17 2137 Left Forearm less than 1 day                Physical Exam   Constitutional: He is oriented to person, place, and time. He appears well-developed.   HENT:   Head: Normocephalic and atraumatic.   Eyes: EOM are normal.   Neck: Normal range of motion.   Cardiovascular: Normal rate, regular rhythm and normal heart sounds.  Exam reveals no gallop and no friction rub.    No murmur heard.  Pulses:       Radial pulses are 2+ on the right side, and 2+ on the left side.   Pulmonary/Chest: Effort normal. He has no wheezes. He has no rales.   Abdominal: Soft. Bowel sounds are normal. He exhibits no distension. There is no tenderness. There is no rebound.   Musculoskeletal: Normal range of motion. He exhibits no edema.   Neurological: He is alert and oriented to person, place, and time. He has normal strength. No cranial nerve deficit or sensory deficit.   Skin: Skin is warm.   Psychiatric: He has a normal mood and affect.       Significant Labs:   CMP   Recent Labs  Lab 05/28/17 1900      K 3.4*      CO2 28   *   BUN 27*   CREATININE 1.4   CALCIUM 9.4   PROT 7.3   ALBUMIN 3.5   BILITOT 0.5   ALKPHOS 75   AST 37   ALT 34   ANIONGAP 12   ESTGFRAFRICA 56.8*   EGFRNONAA 49.1*   , CBC   Recent Labs  Lab 05/28/17 1900 05/28/17 2148   WBC 10.10 10.45   HGB 17.0 15.7   HCT 48.7 46.0    170    and Troponin   Recent Labs  Lab 05/28/17 1900 05/28/17 2148 05/28/17  2311   TROPONINI 0.029* 0.111* 0.224*       Significant  Imaging: Reviewed

## 2017-05-29 NOTE — SUBJECTIVE & OBJECTIVE
"Interval History: "OK. No more chest pain". No acute overnight events.    Review of Systems   Constitutional: Negative for chills, fatigue and fever.   HENT: Negative for ear pain, mouth sores, nosebleeds, postnasal drip, rhinorrhea, sinus pressure, sore throat, tinnitus, trouble swallowing and voice change.    Eyes: Negative for photophobia, pain, redness and visual disturbance.   Respiratory: Negative for apnea, cough, chest tightness, shortness of breath and wheezing.    Cardiovascular: Negative for chest pain, palpitations and leg swelling.   Gastrointestinal: Negative for abdominal pain, blood in stool, constipation, diarrhea, nausea and vomiting.   Endocrine: Negative for cold intolerance, heat intolerance, polydipsia and polyuria.   Genitourinary: Negative for decreased urine volume, difficulty urinating, discharge, dysuria, flank pain, frequency, genital sores, hematuria, penile pain, penile swelling, scrotal swelling, testicular pain and urgency.   Musculoskeletal: Negative for arthralgias, back pain, joint swelling, myalgias and neck pain.   Skin: Negative for color change and rash.   Allergic/Immunologic: Negative for environmental allergies and food allergies.   Neurological: Negative for dizziness, seizures, syncope, weakness, light-headedness, numbness and headaches.   Hematological: Negative for adenopathy. Does not bruise/bleed easily.   Psychiatric/Behavioral: Negative for agitation, confusion, decreased concentration, hallucinations, self-injury, sleep disturbance and suicidal ideas. The patient is not nervous/anxious.      Objective:     Vital Signs (Most Recent):  Temp: 98.2 °F (36.8 °C) (05/29/17 1430)  Pulse: 69 (05/29/17 1430)  Resp: 18 (05/29/17 1430)  BP: 132/84 (05/29/17 1430)  SpO2: (!) 93 % (05/29/17 1430) Vital Signs (24h Range):  Temp:  [96.5 °F (35.8 °C)-98.7 °F (37.1 °C)] 98.2 °F (36.8 °C)  Pulse:  [54-75] 69  Resp:  [8-25] 18  SpO2:  [92 %-97 %] 93 %  BP: (100-174)/() 132/84 "     Weight: 86.9 kg (191 lb 9.3 oz)  Body mass index is 26.72 kg/m².    Intake/Output Summary (Last 24 hours) at 05/29/17 1446  Last data filed at 05/29/17 1400   Gross per 24 hour   Intake              180 ml   Output              400 ml   Net             -220 ml      Physical Exam   Constitutional: He is oriented to person, place, and time. He appears well-developed and well-nourished.   HENT:   Head: Normocephalic and atraumatic.   Right Ear: External ear normal.   Left Ear: External ear normal.   Mouth/Throat: Oropharynx is clear and moist.   Eyes: Conjunctivae and EOM are normal. Pupils are equal, round, and reactive to light.   Neck: Normal range of motion. Neck supple. No thyromegaly present.   Cardiovascular: Normal rate, regular rhythm and normal heart sounds.    No murmur heard.  Pulmonary/Chest: Effort normal and breath sounds normal. He has no wheezes. He has no rales.   Abdominal: Soft. Bowel sounds are normal. He exhibits no mass. There is no tenderness. There is no rebound.   Musculoskeletal: Normal range of motion.   Lymphadenopathy:     He has no cervical adenopathy.   Neurological: He is alert and oriented to person, place, and time.   Skin: Skin is warm and dry.   Psychiatric: He has a normal mood and affect. His behavior is normal.   Vitals reviewed.      Significant Labs:   BMP:   Recent Labs  Lab 05/29/17  0621         K 2.8*   CL 97   CO2 30*   BUN 22   CREATININE 1.0   CALCIUM 9.0   MG 2.0     CBC:   Recent Labs  Lab 05/28/17  1900 05/28/17  2148 05/29/17  0621 05/29/17  1221   WBC 10.10 10.45 11.22  --    HGB 17.0 15.7 16.2  --    HCT 48.7 46.0 47.8 50    170 178  --      Cardiac Markers:   Recent Labs  Lab 05/28/17  1900   BNP <10       Significant Imaging: I have reviewed all pertinent imaging results/findings within the past 24 hours.

## 2017-05-29 NOTE — PROGRESS NOTES
Verbal order from Dr. Machuca to administer 80meq potassium. Will administer and continue to monitor

## 2017-05-29 NOTE — NURSING TRANSFER
Nursing Transfer Note      5/29/2017     Transfer To: echo    Transfer via stretcher    Transfer with cardiac monitoring    Transported by transport    Medicines sent: yes    Chart send with patient: No

## 2017-05-29 NOTE — HPI
"The patient developed back pain around 5P while watching TV and twenty min later developed constant CP across the chest. He had a "cold sweat" and nausea. The patient denies vomiting and SOB with the pain. The pain radiated to both arms and shoulders as well as to the left jaw. Over the last 3 weeks, he has had CP "once or twice a week" that he describes as sharp. The pain lasted "probably less than a minute". He denies fever, hemoptysis and changes with breathing or body position. The patient is a non-smoker. He follows with a cardiologist in [place]. The patient reportedly had a stress test about 4 years ago that was normal. He had CABG x4 in 2006. The patient also has a h/o upper GI issues such that he gets dilation every 4 years.    "

## 2017-05-29 NOTE — PROGRESS NOTES
"[facility] Hospital Medicine  Progress Note    Patient Name: Malcom Mai  MRN: [id number]  Patient Class: IP- Inpatient   Admission Date: 5/28/2017  Length of Stay: 1 days  Attending Physician: Cinthia Feliciano MD  Primary Care Provider: Primary Doctor [facility] Medicine Team: Networked reference to record PCT  Cinthia Feliciano MD    Subjective:     Principal Problem:NSTEMI (non-ST elevated myocardial infarction)    HPI:  The patient developed back pain around 5P while watching TV and twenty min later developed constant CP across the chest. He had a "cold sweat" and nausea. The patient denies vomiting and SOB with the pain. The pain radiated to both arms and shoulders as well as to the left jaw. Over the last 3 weeks, he has had CP "once or twice a week" that he describes as sharp. The pain lasted "probably less than a minute". He denies fever, hemoptysis and changes with breathing or body position. The patient is a non-smoker. He follows with a cardiologist in [place]. The patient reportedly had a stress test about 4 years ago that was normal. He had CABG x4 in 2006. The patient also has a h/o upper GI issues such that he gets dilation every 4 years.       Hospital Course:  Patient admitted to Hospital Medicine Team C. He was evaluated by Interventional Cardiology and was scheduled for [facility].    Interval History: "OK. No more chest pain". No acute overnight events.    Review of Systems   Constitutional: Negative for chills, fatigue and fever.   HENT: Negative for ear pain, mouth sores, nosebleeds, postnasal drip, rhinorrhea, sinus pressure, sore throat, tinnitus, trouble swallowing and voice change.    Eyes: Negative for photophobia, pain, redness and visual disturbance.   Respiratory: Negative for apnea, cough, chest tightness, shortness of breath and wheezing.    Cardiovascular: Negative for chest pain, palpitations and leg swelling.   Gastrointestinal: Negative for abdominal " pain, blood in stool, constipation, diarrhea, nausea and vomiting.   Endocrine: Negative for cold intolerance, heat intolerance, polydipsia and polyuria.   Genitourinary: Negative for decreased urine volume, difficulty urinating, discharge, dysuria, flank pain, frequency, genital sores, hematuria, penile pain, penile swelling, scrotal swelling, testicular pain and urgency.   Musculoskeletal: Negative for arthralgias, back pain, joint swelling, myalgias and neck pain.   Skin: Negative for color change and rash.   Allergic/Immunologic: Negative for environmental allergies and food allergies.   Neurological: Negative for dizziness, seizures, syncope, weakness, light-headedness, numbness and headaches.   Hematological: Negative for adenopathy. Does not bruise/bleed easily.   Psychiatric/Behavioral: Negative for agitation, confusion, decreased concentration, hallucinations, self-injury, sleep disturbance and suicidal ideas. The patient is not nervous/anxious.      Objective:     Vital Signs (Most Recent):  Temp: 98.2 °F (36.8 °C) (05/29/17 1430)  Pulse: 69 (05/29/17 1430)  Resp: 18 (05/29/17 1430)  BP: 132/84 (05/29/17 1430)  SpO2: (!) 93 % (05/29/17 1430) Vital Signs (24h Range):  Temp:  [96.5 °F (35.8 °C)-98.7 °F (37.1 °C)] 98.2 °F (36.8 °C)  Pulse:  [54-75] 69  Resp:  [8-25] 18  SpO2:  [92 %-97 %] 93 %  BP: (100-174)/() 132/84     Weight: 86.9 kg (191 lb 9.3 oz)  Body mass index is 26.72 kg/m².    Intake/Output Summary (Last 24 hours) at 05/29/17 1446  Last data filed at 05/29/17 1400   Gross per 24 hour   Intake              180 ml   Output              400 ml   Net             -220 ml      Physical Exam   Constitutional: He is oriented to person, place, and time. He appears well-developed and well-nourished.   HENT:   Head: Normocephalic and atraumatic.   Right Ear: External ear normal.   Left Ear: External ear normal.   Mouth/Throat: Oropharynx is clear and moist.   Eyes: Conjunctivae and EOM are normal.  Pupils are equal, round, and reactive to light.   Neck: Normal range of motion. Neck supple. No thyromegaly present.   Cardiovascular: Normal rate, regular rhythm and normal heart sounds.    No murmur heard.  Pulmonary/Chest: Effort normal and breath sounds normal. He has no wheezes. He has no rales.   Abdominal: Soft. Bowel sounds are normal. He exhibits no mass. There is no tenderness. There is no rebound.   Musculoskeletal: Normal range of motion.   Lymphadenopathy:     He has no cervical adenopathy.   Neurological: He is alert and oriented to person, place, and time.   Skin: Skin is warm and dry.   Psychiatric: He has a normal mood and affect. His behavior is normal.   Vitals reviewed.      Significant Labs:   BMP:   Recent Labs  Lab 05/29/17  0621         K 2.8*   CL 97   CO2 30*   BUN 22   CREATININE 1.0   CALCIUM 9.0   MG 2.0     CBC:   Recent Labs  Lab 05/28/17  1900 05/28/17  2148 05/29/17  0621 05/29/17  1221   WBC 10.10 10.45 11.22  --    HGB 17.0 15.7 16.2  --    HCT 48.7 46.0 47.8 50    170 178  --      Cardiac Markers:   Recent Labs  Lab 05/28/17  1900   BNP <10       Significant Imaging: I have reviewed all pertinent imaging results/findings within the past 24 hours.    Assessment/Plan:      * NSTEMI (non-ST elevated myocardial infarction)    No active chest pain or SOB. To undergo [facility].  1. Aspirin 81 mg daily.  2. Plavix 75 mg daily.  3. Rosuvastatin 40 mg daily.  4. Heparin drip pending [facility].  5. To [facility] today.           Hypokalemia    Of unclear etiology.   1. Replaced.   2. Monitor.          MARY (acute kidney injury)    Patient with creatinine of 1.4 which down trended to 1. No other laboratory work up in our system to compare. Most consistent with MARY due to hypoperfusion from NSTEMI.  1. Monitor creatinine.  2. Avoid nephrotoxic agents.   3. Renally dose medications.           JOSE JUAN on CPAP    Stable.  1. CPAP qHS.          Gastroesophageal reflux disease without esophagitis     Stable.  1. Pantoprazole.           Acquired hypothyroidism    Stable.  1. Levothyroxine 50 mcg daily.          Essential hypertension    Well controlled.   1. Chlorthalidone 25 mg daily.  2. Irbesartan 300 mg daily.  3. Lopressor 12.5 mg every 6 hours.  4. Monitor blood pressure and titrate therapy as needed.         Mood disorder    Stable.  1. Duloxetine 60 mg daily.  2. Bupropion 150 mg daily.          Lower urinary tract symptoms (LUTS)    Stable.  1. Alfuzosin.             VTE Risk Mitigation         Ordered     Low Risk of VTE  Once      05/28/17 6496          Cinthia Feliciano MD  Department of Hospital Medicine   [facility]

## 2017-05-29 NOTE — PLAN OF CARE
Problem: Patient Care Overview  Goal: Plan of Care Review  Outcome: Ongoing (interventions implemented as appropriate)  POC reviewed with pt. VS stable. Pt went for [facility] today. Potassium administered per MD orders. Pt remains free from falls, trauma, injury; pt tolerating POC well. Will continue to monitor.

## 2017-05-30 VITALS
WEIGHT: 191.56 LBS | HEART RATE: 61 BPM | TEMPERATURE: 99 F | DIASTOLIC BLOOD PRESSURE: 80 MMHG | HEIGHT: 71 IN | BODY MASS INDEX: 26.82 KG/M2 | SYSTOLIC BLOOD PRESSURE: 117 MMHG | RESPIRATION RATE: 22 BRPM | OXYGEN SATURATION: 94 %

## 2017-05-30 PROBLEM — I25.10 CORONARY ARTERY DISEASE: Status: ACTIVE | Noted: 2017-05-30

## 2017-05-30 PROBLEM — N17.9 AKI (ACUTE KIDNEY INJURY): Status: RESOLVED | Noted: 2017-05-29 | Resolved: 2017-05-30

## 2017-05-30 PROBLEM — I10 ESSENTIAL HYPERTENSION: Chronic | Status: ACTIVE | Noted: 2017-05-29

## 2017-05-30 PROBLEM — E03.9 ACQUIRED HYPOTHYROIDISM: Chronic | Status: ACTIVE | Noted: 2017-05-29

## 2017-05-30 PROBLEM — I25.5 ISCHEMIC CARDIOMYOPATHY: Status: ACTIVE | Noted: 2017-05-30

## 2017-05-30 PROBLEM — E87.6 HYPOKALEMIA: Status: RESOLVED | Noted: 2017-05-29 | Resolved: 2017-05-30

## 2017-05-30 PROBLEM — I25.10 CORONARY ARTERY DISEASE: Chronic | Status: ACTIVE | Noted: 2017-05-30

## 2017-05-30 PROBLEM — I25.5 ISCHEMIC CARDIOMYOPATHY: Chronic | Status: ACTIVE | Noted: 2017-05-30

## 2017-05-30 PROBLEM — F39 MOOD DISORDER: Chronic | Status: ACTIVE | Noted: 2017-05-29

## 2017-05-30 PROBLEM — R39.9 LOWER URINARY TRACT SYMPTOMS (LUTS): Chronic | Status: ACTIVE | Noted: 2017-05-29

## 2017-05-30 PROBLEM — G47.33 OSA ON CPAP: Chronic | Status: ACTIVE | Noted: 2017-05-29

## 2017-05-30 LAB
BASOPHILS # BLD AUTO: 0.02 K/UL
BASOPHILS NFR BLD: 0.2 %
DIFFERENTIAL METHOD: ABNORMAL
EOSINOPHIL # BLD AUTO: 0.1 K/UL
EOSINOPHIL NFR BLD: 0.6 %
ERYTHROCYTE [DISTWIDTH] IN BLOOD BY AUTOMATED COUNT: 14.4 %
FACT X PPP CHRO-ACNC: <0.1 IU/ML
HCT VFR BLD AUTO: 49.8 %
HGB BLD-MCNC: 16.8 G/DL
LYMPHOCYTES # BLD AUTO: 1.8 K/UL
LYMPHOCYTES NFR BLD: 14 %
MAGNESIUM SERPL-MCNC: 2.1 MG/DL
MCH RBC QN AUTO: 30.4 PG
MCHC RBC AUTO-ENTMCNC: 33.7 %
MCV RBC AUTO: 90 FL
MONOCYTES # BLD AUTO: 1.5 K/UL
MONOCYTES NFR BLD: 11.2 %
NEUTROPHILS # BLD AUTO: 9.6 K/UL
NEUTROPHILS NFR BLD: 73.8 %
PLATELET # BLD AUTO: 173 K/UL
PMV BLD AUTO: 10.1 FL
RBC # BLD AUTO: 5.53 M/UL
WBC # BLD AUTO: 13.07 K/UL

## 2017-05-30 PROCEDURE — 25000003 PHARM REV CODE 250: Performed by: EMERGENCY MEDICINE

## 2017-05-30 PROCEDURE — 97161 PT EVAL LOW COMPLEX 20 MIN: CPT

## 2017-05-30 PROCEDURE — 85520 HEPARIN ASSAY: CPT

## 2017-05-30 PROCEDURE — 85025 COMPLETE CBC W/AUTO DIFF WBC: CPT

## 2017-05-30 PROCEDURE — 25000003 PHARM REV CODE 250: Performed by: INTERNAL MEDICINE

## 2017-05-30 PROCEDURE — 36415 COLL VENOUS BLD VENIPUNCTURE: CPT

## 2017-05-30 PROCEDURE — 97165 OT EVAL LOW COMPLEX 30 MIN: CPT

## 2017-05-30 PROCEDURE — 99900035 HC TECH TIME PER 15 MIN (STAT)

## 2017-05-30 PROCEDURE — 99238 HOSP IP/OBS DSCHRG MGMT 30/<: CPT | Mod: ,,, | Performed by: HOSPITALIST

## 2017-05-30 PROCEDURE — 83735 ASSAY OF MAGNESIUM: CPT

## 2017-05-30 PROCEDURE — 94660 CPAP INITIATION&MGMT: CPT

## 2017-05-30 RX ADMIN — ROSUVASTATIN CALCIUM 40 MG: 20 TABLET, FILM COATED ORAL at 08:05

## 2017-05-30 RX ADMIN — Medication 3 ML: at 06:05

## 2017-05-30 RX ADMIN — LEVOTHYROXINE SODIUM 50 MCG: 25 TABLET ORAL at 06:05

## 2017-05-30 RX ADMIN — CLOPIDOGREL 75 MG: 75 TABLET, FILM COATED ORAL at 08:05

## 2017-05-30 RX ADMIN — BUPROPION HYDROCHLORIDE 150 MG: 150 TABLET, FILM COATED, EXTENDED RELEASE ORAL at 08:05

## 2017-05-30 RX ADMIN — POTASSIUM CHLORIDE 60 MEQ: 20 SOLUTION ORAL at 08:05

## 2017-05-30 RX ADMIN — ALFUZOSIN HYDROCHLORIDE 10 MG: 10 TABLET ORAL at 08:05

## 2017-05-30 RX ADMIN — Medication 3 ML: at 02:05

## 2017-05-30 RX ADMIN — Medication 12.5 MG: at 12:05

## 2017-05-30 RX ADMIN — Medication 12.5 MG: at 06:05

## 2017-05-30 RX ADMIN — CHLORTHALIDONE 25 MG: 25 TABLET ORAL at 08:05

## 2017-05-30 RX ADMIN — Medication 12.5 MG: at 01:05

## 2017-05-30 RX ADMIN — PREGABALIN 100 MG: 100 CAPSULE ORAL at 08:05

## 2017-05-30 RX ADMIN — PANTOPRAZOLE SODIUM 40 MG: 40 TABLET, DELAYED RELEASE ORAL at 08:05

## 2017-05-30 RX ADMIN — DULOXETINE 60 MG: 60 CAPSULE, DELAYED RELEASE ORAL at 08:05

## 2017-05-30 RX ADMIN — ASPIRIN 81 MG: 81 TABLET, COATED ORAL at 08:05

## 2017-05-30 NOTE — PLAN OF CARE
Problem: Patient Care Overview  Goal: Plan of Care Review  Outcome: Ongoing (interventions implemented as appropriate)  Patient remains free from falls and injuries through out shift. Patient AAO and VSS. Patient denies chest pain and SOB. Patient's family at bedside. Patient BP controlled. Right groin site clean, dry, and intact.Plan of care reviewed with patient. Patient verbalizes understanding of plan.  Will continue to monitor.

## 2017-05-30 NOTE — PT/OT/SLP EVAL
Physical Therapy  Evaluation/Discharge    Malcom Mai   MRN: [id number]   Admitting Diagnosis: NSTEMI (non-ST elevated myocardial infarction)    PT Received On: 05/30/17  PT Start Time: 0815     PT Stop Time: 0837    PT Total Time (min): 22 min       Billable Minutes:  Evaluation 22 (co-eval with OT)     Diagnosis: NSTEMI (non-ST elevated myocardial infarction)   L heart cath 5/29    Past Medical History:   Diagnosis Date    Coronary artery disease     GERD (gastroesophageal reflux disease)     Hypertension       Past Surgical History:   Procedure Laterality Date    CORONARY ARTERY BYPASS GRAFT      EYE SURGERY      HERNIA REPAIR      KNEE SURGERY      POSTERIOR FUSION LUMBAR SPINE W/ CORPECTOMY      SHOULDER SURGERY         Referring physician: Cinthia Feliciano MD  Date referred to PT: 5/29/17    General Precautions: Standard, fall  Orthopedic Precautions: N/A   Braces: N/A       Do you have any cultural, spiritual, Hoahaoism conflicts, given your current situation?: none noted     Patient History:  Lives With: spouse  Living Arrangements: house  Home Accessibility: stairs to enter home, stairs within home  Home Layout: Able to live on 1st floor  Number of Stairs to Enter Home: 3  Number of Stairs Within Home: 16  Stair Railings at Home: outside, present on left side  Transportation Available: car, family or friend will provide  Living Environment Comment: Pt lives with his wife in a 2SH with 3 LISA and L hand rail. Pt reports that he is able to live on the first floor and only occasionally goes to the 2nd floor. Pt reports that PTA he was (I) with ADLs and ambulation, working, and driving. Pt's wife is able to assist upon d/c.   Equipment Currently Used at Home: none (owns: RW, SPC, BSC, shower chair)    Previous Level of Function:  Ambulation Skills: independent  Transfer Skills: independent  ADL Skills: independent  Work/Leisure Activity: independent    Subjective:  Communicated with RN prior to  session.  Pt agreeable to therapy.   Chief Complaint: back and shoulder pain   Patient goals: return home     Pain/Comfort  Pain Rating 1: 1/10  Location - Side 1: Bilateral  Location - Orientation 1: generalized  Location 1: back  Pain Addressed 1: Reposition, Distraction  Pain Rating 2: 1/10  Location - Side 2: Bilateral  Location - Orientation 2: generalized  Location 2: shoulder  Pain Addressed 2: Reposition, Distraction      Objective:   Patient found with: telemetry, pulse ox (continuous)     Cognitive Exam:  Oriented to: Person, Place, Time and Situation    Follows Commands/attention: Follows multistep  commands  Communication: clear/fluent  Safety awareness/insight to disability: intact    Physical Exam:  Postural examination/scapula alignment: Rounded shoulder    Skin integrity: Visible skin intact  Edema: None noted     Sensation:   Decreased in BLE (L>R) 2* neuropathy     Lower Extremity Range of Motion:  Right Lower Extremity: WFL  Left Lower Extremity: WFL    Lower Extremity Strength:  Right Lower Extremity: WFL  Left Lower Extremity: WFL    Functional Mobility:  Bed Mobility: not performed 2* pt sitting UIC before and after therapy session      Transfers:  Sit <> Stand Assistance: Stand By Assistance  Sit <> Stand Assistive Device: No Assistive Device    Gait:   Gait Distance: ~200 ft.   Assistance 1: Stand by Assistance  Gait Assistive Device: No device  Gait Pattern: 2-point gait  Gait Deviation(s): decreased weight-shifting ability, foot flat (mild instability)    Balance:   Static Sit: independent   Dynamic Sit: supervision  Static Stand: supervision-SBA  Dynamic stand: SBA    Therapeutic Activities and Exercises:  Pt educated on role of PT and PT POC. Pt verbalized understanding.   Discussed d/c recs for OP PT and OT to address balance and orthopedic deficits. Pt and pt's wife verbalized agreement.     AM-PAC 6 CLICK MOBILITY  How much help from another person does this patient currently need?   1  = Unable, Total/Dependent Assistance   2 = A lot, Maximum/Moderate Assistance  3 = A little, Minimum/Contact Guard/Supervision  4 = None, Modified [place]/Independent    Turning over in bed (including adjusting bedclothes, sheets and blankets)?: 4  Sitting down on and standing up from a chair with arms (e.g., wheelchair, bedside commode, etc.): 3  Moving from lying on back to sitting on the side of the bed?: 4  Moving to and from a bed to a chair (including a wheelchair)?: 3  Need to walk in hospital room?: 3  Climbing 3-5 steps with a railing?: 3  Total Score: 20     AM-PAC Raw Score CMS G-Code Modifier Level of Impairment Assistance   6 % Total / Unable   7 - 9 CM 80 - 100% Maximal Assist   10 - 14 CL 60 - 80% Moderate Assist   15 - 19 CK 40 - 60% Moderate Assist   20 - 22 CJ 20 - 40% Minimal Assist   23 CI 1-20% SBA / CGA   24 CH 0% Independent/ Mod I     Patient left seated EOB with all lines intact, call button in reach and pt's wife present.    Assessment:   Malcom Mai is a 76 y.o. male with a medical diagnosis of NSTEMI (non-ST elevated myocardial infarction) and presents with good participation in therapy session and high motivation to return to PLOF. Pt was able to perform functional mobility without physical assist or use of DME. Mild instability noted with ambulation 2* baseline foot/ankle orthopedic issues and neuropathy, but no overt LOB noted. Pt is close to baseline with mobility with no acute PT needs at this time. D/C IP PT services. Recommend OP PT and OT upon d/c to address balance and orthopedic issues.     Rehab identified problem list/impairments: Rehab identified problem list/impairments: gait instability, impaired balance, decreased lower extremity function, decreased upper extremity function    Rehab potential is good.    Activity tolerance: Good    Discharge recommendations: Discharge Facility/Level Of Care Needs: outpatient PT, outpatient OT     Barriers to discharge:  Barriers to Discharge: Inaccessible home environment (3 LISA )    Equipment recommendations: Equipment Needed After Discharge: none     GOALS:    Physical Therapy Goals     Not on file          Multidisciplinary Problems (Resolved)        Problem: Physical Therapy Goal    Goal Priority Disciplines Outcome Goal Variances Interventions   Physical Therapy Goal   (Resolved)     PT/OT, PT Outcome(s) achieved                     PLAN:  Pt is d/c from IP PT services as he is baseline with mobility and has no acute PT needs at this time.   Plan of Care reviewed with: patient, spouse        Devika Ibrahim, PT, DPT   5/30/2017  [phone]

## 2017-05-30 NOTE — DISCHARGE SUMMARY
"[facility] Hospital Medicine  Discharge Summary      Patient Name: Malcom Mai  MRN: [id number]  Admission Date: 5/28/2017  Hospital Length of Stay: 2 days  Discharge Date and Time:  05/30/2017 1:16 PM  Attending Physician: Viraj Khan MD   Discharging Provider: Viraj Khan MD  Primary Care Provider: Primary Doctor [facility] Medicine Team: [facility] Viraj Khan MD    HPI:   The patient developed back pain around 5P while watching TV and twenty min later developed constant CP across the chest. He had a "cold sweat" and nausea. The patient denies vomiting and SOB with the pain. The pain radiated to both arms and shoulders as well as to the left jaw. Over the last 3 weeks, he has had CP "once or twice a week" that he describes as sharp. The pain lasted "probably less than a minute". He denies fever, hemoptysis and changes with breathing or body position. The patient is a non-smoker. He follows with a cardiologist in [place]. The patient reportedly had a stress test about 4 years ago that was normal. He had CABG x4 in 2006. The patient also has a h/o upper GI issues such that he gets dilation every 4 years.       Procedure(s) (LRB):  HEART CATH-LEFT (Left)      Indwelling Lines/Drains at time of discharge:   Lines/Drains/Airways          No matching active lines, drains, or airways        Hospital Course:   Patient admitted to [facility] Medicine Team C. He was evaluated by Interventional Cardiology and was scheduled for [facility] on 5/29.  Results for cardiac cath as noted below, no PCI was performed and further medical therapy recommended by cardiology and patient is to follow up with outpatient primary cardiologist in 1 week, he will need to contact his outpatient cardiologist.     Left heart Cath results  B. Summary/Post-Operative Diagnosis       LVEDP=15mmHg.    Patent LIMA to LAD.    Patent SVG to PDA.    Occluded SVG to OM.    Occluded SVG to D1.    99% proximal LAD " stenosis.     of ostial LCx.     of PDA.    Right to left collaterals from PLB to LCx.      Patient seen day of discharge  Vital signs reviewed  Physical Exam   Constitutional: He is oriented to person, place, and time. He appears well-developed and well-nourished.   Eyes: Conjunctivae are normal.   Neck: Normal range of motion. Neck supple.   Cardiovascular: Normal rate, regular rhythm and normal heart sounds.  Exam reveals no friction rub.    No murmur heard.  Pulmonary/Chest: Effort normal and breath sounds normal. No respiratory distress. He has no rales.   Abdominal: Soft. Bowel sounds are normal. He exhibits no distension. There is no tenderness. There is no guarding.   Musculoskeletal: He exhibits no edema.   Neurological: He is alert and oriented to person, place, and time.   Skin: Skin is warm and dry.         Consults:   Consults         Status Ordering Provider     Inpatient consult to Cardiology  Once     Provider:  (Not yet assigned)    Completed ANANT CORNELIUS     Inpatient consult to Interventional Cardiology  Once     Provider:  (Not yet assigned)    Completed CLAUDINE CAIN          Significant Diagnostic Studies: Labs:   CMP   Recent Labs  Lab 05/28/17  1900 05/29/17  0621 05/29/17  1717    136 136   K 3.4* 2.8* 4.1    97 103   CO2 28 30* 24   * 107 122*   BUN 27* 22 17   CREATININE 1.4 1.0 1.0   CALCIUM 9.4 9.0 8.9   PROT 7.3 6.8  --    ALBUMIN 3.5 3.4*  --    BILITOT 0.5 0.7  --    ALKPHOS 75 68  --    AST 37 171*  --    ALT 34 46*  --    ANIONGAP 12 9 9   ESTGFRAFRICA 56.8* >60.0 >60.0   EGFRNONAA 49.1* >60.0 >60.0   , CBC   Recent Labs  Lab 05/28/17  2148 05/29/17  0621  05/30/17  0639   WBC 10.45 11.22  --  13.07*   HGB 15.7 16.2  --  16.8   HCT 46.0 47.8  < > 49.8    178  --  173   < > = values in this interval not displayed., Lipid Panel   Lab Results   Component Value Date    CHOL 118 (L) 05/29/2017    HDL 27 (L) 05/29/2017    LDLCALC 73.8 05/29/2017     TRIG 86 05/29/2017    CHOLHDL 22.9 05/29/2017   , Troponin   Recent Labs  Lab 05/29/17  0621   TROPONINI 8.381*    and A1C: No results for input(s): HGBA1C in the last 4320 hours.  Cardiac Graphics: Echocardiogram:   2D echo with color flow doppler:   Results for orders placed or performed during the hospital encounter of 05/28/17   2D echo with color flow doppler   Result Value Ref Range    EF 45 55 - 65    Mitral Valve Regurgitation MILD     Diastolic Dysfunction Yes (A)     Est. PA Systolic Pressure 24.21     Tricuspid Valve Regurgitation MILD    CONCLUSIONS     1 - Mildly depressed left ventricular systolic function (EF 45-50%).     2 - Wall motion abnormalities.     3 - Moderate left atrial enlargement.     4 - Impaired LV relaxation, normal LAP (grade 1 diastolic dysfunction).     5 - Normal right ventricular systolic function .     6 - The estimated PA systolic pressure is greater than 24 mmHg.     7 - Mild mitral regurgitation.     8 - Mild tricuspid regurgitation.       Pending Diagnostic Studies:     None        Final Active Diagnoses:    Diagnosis Date Noted POA    PRINCIPAL PROBLEM:  NSTEMI (non-ST elevated myocardial infarction) [I21.4] 05/29/2017 Yes    Coronary artery disease [I25.10] 05/30/2017 Yes     Chronic    Ischemic cardiomyopathy, EF 45% [I25.5] 05/30/2017 Yes    Essential hypertension [I10] 05/29/2017 Yes     Chronic    JOSE JUAN on CPAP [G47.33, Z99.89] 05/29/2017 Not Applicable     Chronic    Lower urinary tract symptoms (LUTS) [R39.9] 05/29/2017 Yes     Chronic    Mood disorder [F39] 05/29/2017 Yes     Chronic    Acquired hypothyroidism [E03.9] 05/29/2017 Yes     Chronic    Gastroesophageal reflux disease without esophagitis [K21.9] 05/29/2017 Yes      Problems Resolved During this Admission:    Diagnosis Date Noted Date Resolved POA    MARY (acute kidney injury) [N17.9] 05/29/2017 05/30/2017 Yes    Hypokalemia [E87.6] 05/29/2017 05/30/2017 Yes      No new Assessment & Plan notes have  been filed under this hospital service since the last note was generated.  Service: Hospital Medicine      Discharged Condition: good    Disposition: Home or Self Care    Follow Up:  Follow-up Information     [facility] - Card[facility]. Schedule an appointment as soon as possible for a visit in 1 week.    Specialty:  Cardiology  Contact information:  [address]  [facility] [address] [phone]  Additional information:  3rd floor           Dr. AKHIL Isaac. Schedule an appointment as soon as possible for a visit in 1 week.    Why:  please schedule a follow up appointment with your primary cardiologist  Contact information:  Address: [address] [place] [address] [address] [address]  Phone: [phone]               Patient Instructions:     Diet Cardiac     Activity as tolerated     Other restrictions (specify):   Order Comments: No heavy lifting x 14 days     Call MD for:  temperature >100.4     Call MD for:  persistent nausea and vomiting or diarrhea     Call MD for:  severe uncontrolled pain     Call MD for:  redness, tenderness, or signs of infection (pain, swelling, redness, odor or green/yellow discharge around incision site)     Call MD for:  difficulty breathing or increased cough     Call MD for:  severe persistent headache     Call MD for:  worsening rash     Call MD for:  increased confusion or weakness     Call MD for:  persistent dizziness, light-headedness, or visual disturbances       Medications:  Reconciled Home Medications:   Current Discharge Medication List      START taking these medications    Details   aspirin (ECOTRIN) 81 MG EC tablet Take 1 tablet (81 mg total) by mouth once daily.  Refills: 0      nitroGLYCERIN (NITROSTAT) 0.4 MG SL tablet Place 1 tablet (0.4 mg total) under the tongue every 5 (five) minutes as needed for Chest pain.  Qty: 30 tablet, Refills: 0         CONTINUE these medications which have CHANGED    Details   rosuvastatin (CRESTOR) 40 MG Tab Take 1 tablet (40 mg  total) by mouth once daily.  Qty: 30 tablet, Refills: 0         CONTINUE these medications which have NOT CHANGED    Details   alfuzosin (UROXATRAL) 10 mg Tb24 Take 10 mg by mouth daily with breakfast.      buPROPion (WELLBUTRIN SR) 150 MG TBSR 12 hr tablet Take 150 mg by mouth 2 (two) times daily.      carvedilol (COREG) 6.25 MG tablet Take 6.25 mg by mouth 2 (two) times daily with meals.      celecoxib (CELEBREX) 200 MG capsule Take 200 mg by mouth 2 (two) times daily.      chlorthalidone (HYGROTEN) 25 MG Tab Take 25 mg by mouth once daily.      clopidogrel (PLAVIX) 75 mg tablet Take 75 mg by mouth once daily.      duloxetine (CYMBALTA) 60 MG capsule Take 60 mg by mouth once daily.      irbesartan (AVAPRO) 300 MG tablet Take 300 mg by mouth every evening.      levothyroxine (SYNTHROID) 50 MCG tablet Take 50 mcg by mouth once daily.      mirabegron 50 mg Tb24 Take by mouth.      pregabalin (LYRICA) 100 MG capsule Take 100 mg by mouth 2 (two) times daily.      lansoprazole (PREVACID) 30 MG capsule Take 30 mg by mouth once daily.      potassium chloride (MICRO-K) 10 MEQ CpSR Take 10 mEq by mouth once daily.           Time spent on the discharge of patient: 25 minutes    Viraj Khan MD  Department of Hospital Medicine  [facility]

## 2017-05-30 NOTE — PLAN OF CARE
Problem: Occupational Therapy Goal  Goal: Occupational Therapy Goal  No goals set    Outcome: Outcome(s) achieved Date Met: 05/30/17  OT eval completed. Pt presenting near PLOF with no need for acute OT services at this time. D/C from acute OT.     ALMA Vaughan  5/30/2017

## 2017-05-30 NOTE — NURSING
PT DC'ed per MD order. AVS reviewed and patient verbalized understanding. Tele, fit-bit, visi, and ipad removed and returned to nursing station. PIV's removed with catheter tip intact. Patient leaving floor in wheelchair escorted by family.

## 2017-05-30 NOTE — PLAN OF CARE
Problem: Physical Therapy Goal  Goal: Physical Therapy Goal  Outcome: Outcome(s) achieved Date Met: 05/30/17  PT evaluation complete. No goals established as pt is baseline with mobility and has no acute PT needs at this time. D/C from PT services.    Devika Ibrahim, PT, DPT   5/30/2017  [phone]

## 2017-05-30 NOTE — PT/OT/SLP DISCHARGE
Occupational Therapy Discharge Summary    Malcom Mai  MRN: [id number]   NSTEMI (non-ST elevated myocardial infarction)   Patient Discharged from acute Occupational Therapy on 5/30/17.  Please refer to prior OT note dated on 5/30/17 for functional status.     Assessment:   Patient appropriate for care in another setting.  GOALS:    Occupational Therapy Goals     Not on file          Multidisciplinary Problems (Resolved)        Problem: Occupational Therapy Goal    Goal Priority Disciplines Outcome Interventions   Occupational Therapy Goal   (Resolved)     OT, PT/OT Outcome(s) achieved    Description:  No goals set                    Reasons for Discontinuation of Therapy Services  Therapist determines that the patient will no longer benefit from therapy services.      Plan:  Patient Discharged to: currently still at [facility]; recommend outpatient OT/PT at D/C   ABHINAV Ontiveros  5/30/2017  .

## 2017-05-30 NOTE — PT/OT/SLP EVAL
Occupational Therapy  Evaluation and D/C    Malcom Mai   MRN: [id number]   Admitting Diagnosis: NSTEMI (non-ST elevated myocardial infarction)    OT Date of Treatment: 05/30/17   OT Start Time: 0816  OT Stop Time: 0837  OT Total Time (min): 21 min    Billable Minutes:  Evaluation 21    Diagnosis: NSTEMI (non-ST elevated myocardial infarction)     Past Medical History:   Diagnosis Date    Coronary artery disease     GERD (gastroesophageal reflux disease)     Hypertension       Past Surgical History:   Procedure Laterality Date    CORONARY ARTERY BYPASS GRAFT      EYE SURGERY      HERNIA REPAIR      KNEE SURGERY      POSTERIOR FUSION LUMBAR SPINE W/ CORPECTOMY      SHOULDER SURGERY         Referring physician: Cinthia Feliciano  Date referred to OT: 5-29-17    General Precautions: Standard, fall, other (see comments) (cardiac)  Orthopedic Precautions: N/A  Braces: N/A    Do you have any cultural, spiritual, Rastafari conflicts, given your current situation?: none     Patient History:  Living Environment  Lives With: spouse  Living Arrangements: house  Home Accessibility: stairs to enter home, stairs within home  Home Layout: Able to live on 1st floor  Number of Stairs to Enter Home: 3  Number of Stairs Within Home: 16  Stair Railings at Home: outside, present on left side  Transportation Available: car, family or friend will provide  Living Environment Comment: Pt lives with his wife in a 2 SH with 3 LISA with railing on the L. Pt has ~16 steps inside the house to get to the 2nd floor but reports he can live on the first floor and has no great need to go upstairs.  PTA pt was (I) with ADLs, IADLs and driving.   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  "That's as high as this shoulder goes, i've had rotator cuff surgery."  Chief Complaint: none   Patient/Family stated goals: return to home     Pain/Comfort  Pain Rating 1: 1/10  Location - Side 1: Bilateral  Location - Orientation 1: generalized  Location 1: back  Pain Addressed 1: Distraction, Reposition  Pain Rating 2: 1/10  Location - Side 2: Bilateral  Location - Orientation 2: generalized  Location 2: shoulder  Pain Addressed 2: Distraction, Reposition    Objective:  Patient found with: telemetry, peripheral IV, pulse ox (continuous)    Cognitive Exam:  Oriented to: Person, Place and Situation  Follows Commands/attention: Follows multistep  commands  Communication: clear/fluent  Memory:  No Deficits noted  Safety awareness/insight to disability: intact  Coping skills/emotional control: Appropriate to situation    Visual/perceptual:  Intact    Physical Exam:  Postural examination/scapula alignment: No postural abnormalities identified  Skin integrity: Visible skin intact  Edema: None noted     Sensation:   Impaired  in B LE    Upper Extremity Range of Motion:  Right Upper Extremity: Deficits: limited shoulder flexion (~100*), abduction (~100*), and external rotation (~45*) . Pt remains functional but decreased ROM may cause pt some mild limitations.   Left Upper Extremity: WFL but with some slight deficits in shoulder flexion, and abduction.     Upper Extremity Strength:  Right Upper Extremity: WNL in available range  Left Upper Extremity: WNL in available range   Strength: WFL    Fine motor coordination:   Intact    Gross motor coordination: WFL    Functional Mobility:  Bed Mobility: N/A " "pt found seated at EOB.    Transfers:  Sit <> Stand Assistance: Stand By Assistance  Sit <> Stand Assistive Device: No Assistive Device    Functional Ambulation: Pt ambulated ~200 ft with SBA and no AD.     Activities of Daily Living:  Feeding Level of Assistance: (I)  UE Dressing Level of Assistance: Minimum assistance (donning gown as robe); limited by shoulder ROM; has tech's for dressing at home  LE Dressing Level of Assistance: Stand by assistance (requiring increased time- Donning socks)  Toileting Level of Assistance: Activity did not occur  Bathing Level of Assistance: Activity did not occur    Balance:   Static Sit: GOOD: Takes MODERATE challenges from all directions  Dynamic Sit: GOOD: Maintains balance through MODERATE excursions of active trunk movement  Static Stand: GOOD: Takes MODERATE challenges from all directions  Dynamic stand: GOOD: Needs SUPERVISION only during gait and able to self right with moderate     Therapeutic Activities and Exercises:  Pt and wife educated on role of OT, D/C from acute OT, and therapy options after D/C.    AM-PAC 6 CLICK ADL  How much help from another person does this patient currently need?  1 = Unable, Total/Dependent Assistance  2 = A lot, Maximum/Moderate Assistance  3 = A little, Minimum/Contact Guard/Supervision  4 = None, Modified Hopkins/Independent    Putting on and taking off regular lower body clothing? : 4  Bathing (including washing, rinsing, drying)?: 4  Toileting, which includes using toilet, bedpan, or urinal? : 4  Putting on and taking off regular upper body clothing?: 3  Taking care of personal grooming such as brushing teeth?: 4  Eating meals?: 4  Total Score: 23    AM-PAC Raw Score CMS "G-Code Modifier Level of Impairment Assistance   6 % Total / Unable   7 - 9 CM 80 - 100% Maximal Assist   10-14 CL 60 - 80% Moderate Assist   15 - 19 CK 40 - 60% Moderate Assist   20 - 22 CJ 20 - 40% Minimal Assist   23 CI 1-20% SBA / CGA   24 CH 0% " Independent/ Mod I       Patient left seated at EOB with all lines intact, call button in reach and wife present    Assessment:  Malcom Mai is a 76 y.o. male with a medical diagnosis of NSTEMI (non-ST elevated myocardial infarction) and presents with impaired: endurance, functional mobility, and ROM. Pt with good motivation and good tolerance of evaluation. Pt presents near PLOF at this time with no need for acute OT services. However, pt could benefit from outpatient OT services to address limited ROM in B shoulders causing slight deficits in self-care. Pt to be D/C from acute OT services with a recommendation to outpatient services to work on ROM, endurance, and UE functioning in order to increase pt (I) with ADLs and IADLs.  Pt with limited benefit of acute OT in relation to shoulder deficits as acute therapy more focused on pt's return to PLOF.    Pt presented with a low complexity OT evaluation.  Pt required a brief an expanded review of medical history and occupational profile.  Pt demod 1-3 performance deficits (physical, cognitive, or psychosocial) resulting in limitations and engagement restrictions.  Clinical decision making required low analytical complexity with limited treatment options.  Pt with no cormorbidities and required no modification of task/assistance with assessment.      Physical- skills refer to impairments of body structure or functions, balance, mobility; strength, endurance, FMC, GMC, sensation, dexterity, and posture.  Cognitive- skills refer to ability to attend, communicate, perceive, think, understand, problem solve, mentally sequence, learn, and remember resulting in ability to organize occupational performance in timely and safe manner.    Psychosocial- skills refer to interpersonal interactions, habits, routines, and behaviors, active use of coping strategies, and environmental adaptations to appropriately participate in everyday tasks and situations.     Rehab identified  problem list/impairments: Rehab identified problem list/impairments: impaired endurance, impaired functional mobilty, gait instability, impaired balance, decreased ROM, decreased upper extremity function, impaired cardiopulmonary response to activity, decreased lower extremity function, impaired sensation    Rehab potential is excellent.    Activity tolerance: Good    Discharge recommendations: Discharge Facility/Level Of Care Needs: outpatient OT     Barriers to discharge: Barriers to Discharge: Inaccessible home environment (3 LISA, 16 steps inside home )    Equipment recommendations: none     GOALS:    Occupational Therapy Goals     Not on file          Multidisciplinary Problems (Resolved)        Problem: Occupational Therapy Goal    Goal Priority Disciplines Outcome Interventions   Occupational Therapy Goal   (Resolved)     OT, PT/OT Outcome(s) achieved    Description:  No goals set                      PLAN:  Patient to be seen  (D/C) to address the above listed problems via  (D/C)  Plan of Care expires:    Plan of Care reviewed with: patient, spouse         ALMA Vaughan  05/30/2017

## 2017-06-28 LAB — CORONARY STENOSIS: ABNORMAL

## 2019-08-19 NOTE — ASSESSMENT & PLAN NOTE
- LT4   He has had some lesions treated with cryotherapy by the dermatologist. Last cryotherapy was 3-4 months ago but he still has some scarring of a facial lesion that hasn't healed.

## 2025-01-10 ENCOUNTER — ON-DEMAND VIRTUAL (OUTPATIENT)
Dept: URGENT CARE | Facility: CLINIC | Age: 85
End: 2025-01-10
Payer: MEDICARE

## 2025-01-10 DIAGNOSIS — J32.9 SINUSITIS, UNSPECIFIED CHRONICITY, UNSPECIFIED LOCATION: Primary | ICD-10-CM

## 2025-01-10 RX ORDER — PROMETHAZINE HYDROCHLORIDE AND DEXTROMETHORPHAN HYDROBROMIDE 6.25; 15 MG/5ML; MG/5ML
5 SYRUP ORAL EVERY 6 HOURS PRN
Qty: 118 ML | Refills: 0 | Status: SHIPPED | OUTPATIENT
Start: 2025-01-10

## 2025-01-10 RX ORDER — AMOXICILLIN AND CLAVULANATE POTASSIUM 875; 125 MG/1; MG/1
1 TABLET, FILM COATED ORAL 2 TIMES DAILY
Qty: 20 TABLET | Refills: 0 | Status: SHIPPED | OUTPATIENT
Start: 2025-01-10 | End: 2025-01-20

## 2025-01-10 RX ORDER — FLUTICASONE PROPIONATE 50 MCG
1 SPRAY, SUSPENSION (ML) NASAL DAILY
Qty: 16 G | Refills: 0 | Status: SHIPPED | OUTPATIENT
Start: 2025-01-10

## 2025-01-10 NOTE — PROGRESS NOTES
Subjective:      Patient ID: Malcom Mai is a 84 y.o. male.    Vitals:  vitals were not taken for this visit.     Chief Complaint: Cough      Visit Type: TELE AUDIOVISUAL - This visit was conducted virtually based on  subjective information and limited objective exam    Present with the patient at the time of consultation: TELEMED PRESENT WITH PATIENT: with daughter  LOCATION OF PATIENT [place]  Two patient identifiers used to verify patient- saying out date of birth and full name.       Past Medical History:   Diagnosis Date    Coronary artery disease     GERD (gastroesophageal reflux disease)     Hypertension      Past Surgical History:   Procedure Laterality Date    CORONARY ARTERY BYPASS GRAFT      EYE SURGERY      HERNIA REPAIR      KNEE SURGERY      POSTERIOR FUSION LUMBAR SPINE W/ CORPECTOMY      SHOULDER SURGERY       Review of patient's allergies indicates:   Allergen Reactions    Morphine      Current Outpatient Medications on File Prior to Visit   Medication Sig Dispense Refill    alfuzosin (UROXATRAL) 10 mg Tb24 Take 10 mg by mouth daily with breakfast.      aspirin (ECOTRIN) 81 MG EC tablet Take 1 tablet (81 mg total) by mouth once daily.  0    buPROPion (WELLBUTRIN SR) 150 MG TBSR 12 hr tablet Take 150 mg by mouth 2 (two) times daily.      carvedilol (COREG) 6.25 MG tablet Take 6.25 mg by mouth 2 (two) times daily with meals.      celecoxib (CELEBREX) 200 MG capsule Take 200 mg by mouth 2 (two) times daily.      chlorthalidone (HYGROTEN) 25 MG Tab Take 25 mg by mouth once daily.      clopidogrel (PLAVIX) 75 mg tablet Take 75 mg by mouth once daily.      duloxetine (CYMBALTA) 60 MG capsule Take 60 mg by mouth once daily.      irbesartan (AVAPRO) 300 MG tablet Take 300 mg by mouth every evening.      lansoprazole (PREVACID) 30 MG capsule Take 30 mg by mouth once daily.      levothyroxine (SYNTHROID) 50 MCG tablet Take 50 mcg by mouth once daily.      mirabegron 50 mg Tb24 Take by mouth.       nitroGLYCERIN (NITROSTAT) 0.4 MG SL tablet Place 1 tablet (0.4 mg total) under the tongue every 5 (five) minutes as needed for Chest pain. 30 tablet 0    potassium chloride (MICRO-K) 10 MEQ CpSR Take 10 mEq by mouth once daily.      pregabalin (LYRICA) 100 MG capsule Take 100 mg by mouth 2 (two) times daily.      rosuvastatin (CRESTOR) 40 MG Tab Take 1 tablet (40 mg total) by mouth once daily. 30 tablet 0     No current facility-administered medications on file prior to visit.     No family history on file.    Medications Ordered                [id number] - [place] - [address] AT [facility] [address] [facility]   [address], [place] [address]    Telephone: [phone]   Fax: [phone]   Hours: Not open 24 hours                         E-Prescribed (3 of 3)              amoxicillin-clavulanate 875-125mg (AUGMENTIN) 875-125 mg per tablet    Sig: Take 1 tablet by mouth 2 (two) times daily. for 10 days       Start: 1/10/25     Quantity: 20 tablet Refills: 0                         fluticasone propionate (FLONASE) 50 mcg/actuation nasal spray    Si spray (50 mcg total) by Each Nostril route once daily.       Start: 1/10/25     Quantity: 16 g Refills: 0                         promethazine-dextromethorphan (PROMETHAZINE-DM) 6.25-15 mg/5 mL Syrp    Sig: Take 5 mLs by mouth every 6 (six) hours as needed.       Start: 1/10/25     Quantity: 118 mL Refills: 0                           Ohs Peq Odvv Intake    1/10/2025 12:56 PM CST - Filed by Patient   What is your current physical address in the event of a medical emergency? [address] [place] [address]   Are you able to take your vital signs? No   Please attach any relevant images or files    Is your employer contracted with [facility] [employer]? No         85yo M patient with HTN, CAD, hx NSTEMI, JOSE JUAN, hypothyroidism presenting with persistent cough productive of clear mucus, 102 fever, body aches, chills that started  about 2 weeks ago. Has tried robitussin dm and tessalon pearls with mild relief. Tylenol has helped temporarily with fever chills. Denies sob, n/v/d.    Cough  Associated symptoms include chills, a fever, headaches, myalgias and a sore throat.       Constitution: Positive for chills and fever.   HENT:  Positive for sore throat.    Respiratory:  Positive for cough and sputum production.    Musculoskeletal:  Positive for muscle ache.   Neurological:  Positive for headaches.        Objective:   The physical exam was conducted virtually.    AAO x 3 ; no acute distress noted; appearance normal; mood and behavior normal; thought process normal  Head- normocephalic  Nose- appears normal, no discharge or erythema  Eyes- pupils appear normal in size, no drainage, no erythema  Ears- normal appearing; no discharge, no erythema  Mouth- appears normal  Oropharynx- no erythema, lesions  Lungs- breathing at a normal rate, no acute distress noted  Heart- no reports of tachycardia, palpitations, chest pain  Abdomen- non distended, non tender reported by patient  Skin- warm and dry, no erythema or edema noted by patient or visualized  Psych- as above; no si/hi      Assessment:     1. Sinusitis, unspecified chronicity, unspecified location        Plan:     Patient appears ill but stable appearing. Given duration of symptoms, will rx course of abx and other conservative measures as below. Pt to follow-up in 3 days if symptoms not improved.    Please proceed with the following supportive management measures:  Get plenty of rest and drink plenty of water.  Warm saltwater gargles twice daily and warm teas with honey and lemon can help sore throat and cough.  Take an OTC allergy medication (Xyzal, Zyrtec, Allegra, Claritin) once per day, in the evening, for at least the next two weeks. Avoid allergy medications with decongestants (denoted with a -D) if Hx hypertension).  Recommend use of Robitussin-DM (Or Tussin-DM) as needed for  cough.  You may rotate between tylenol (acetaminophen) and advil (ibuprofen) every four hours as needed for pain or fever.  Use nasal steroid as directed. You may also use a humidifer or run a hot shower for congestion. Vicks rub also a good option.  May purchase over the counter nasal saline spray. Use up to four times per day. Wait at least 20 minutes after using nasal steroids before using saline rinses.          Thank you for choosing [facility]!    Our goal in the [facility] is to always provide outstanding medical care. You may receive a survey by mail or e-mail in the next week regarding your experience today. We would greatly appreciate you completing and returning the survey. Your feedback provides us with a way to recognize our staff who provide very good care, and it helps us learn how to improve when your experience was below our aspiration of excellence.         We appreciate you trusting us with your medical care. We hope you feel better soon. We will be happy to take care of you for all of your future medical needs.    You must understand that you've received an Urgent Care treatment only and that you may be released before all your medical problems are known or treated. You, the patient, will arrange for follow up care as instructed.    Follow up with your PCP or specialty clinic as directed in the next 1-2 weeks if not improved or as needed.  You can call [phone] to schedule an appointment with the appropriate provider.    If your condition worsens we recommend that you receive another evaluation in person, with your primary care provider, urgent care or at the emergency room immediately or contact your primary medical clinics after hours call service to discuss your concerns.         Sinusitis, unspecified chronicity, unspecified location  -     fluticasone propionate (FLONASE) 50 mcg/actuation nasal spray; 1 spray (50 mcg total) by Each Nostril route  once daily.  Dispense: 16 g; Refill: 0  -     promethazine-dextromethorphan (PROMETHAZINE-DM) 6.25-15 mg/5 mL Syrp; Take 5 mLs by mouth every 6 (six) hours as needed.  Dispense: 118 mL; Refill: 0  -     amoxicillin-clavulanate 875-125mg (AUGMENTIN) 875-125 mg per tablet; Take 1 tablet by mouth 2 (two) times daily. for 10 days  Dispense: 20 tablet; Refill: 0

## 2025-01-11 ENCOUNTER — ON-DEMAND VIRTUAL (OUTPATIENT)
Dept: URGENT CARE | Facility: CLINIC | Age: 85
End: 2025-01-11
Payer: MEDICARE

## 2025-01-11 VITALS — OXYGEN SATURATION: 95 %

## 2025-01-11 DIAGNOSIS — U07.1 COVID-19: Primary | ICD-10-CM

## 2025-01-11 RX ORDER — DEXAMETHASONE 6 MG/1
6 TABLET ORAL
Qty: 5 TABLET | Refills: 0 | Status: SHIPPED | OUTPATIENT
Start: 2025-01-11 | End: 2025-01-16

## 2025-01-11 RX ORDER — ALBUTEROL SULFATE 90 UG/1
2 INHALANT RESPIRATORY (INHALATION) EVERY 6 HOURS PRN
Qty: 18 G | Refills: 0 | Status: SHIPPED | OUTPATIENT
Start: 2025-01-11

## 2025-01-12 NOTE — PROGRESS NOTES
Subjective:      Patient ID: Malcom Mai is a 84 y.o. male.    Vitals:  vitals were not taken for this visit.     Chief Complaint: COVID-19 Concerns (Tested positive for covid 19)      Visit Type: TELE AUDIOVISUAL    Present with the patient at the time of consultation: TELEMED PRESENT WITH PATIENT: daughter    Past Medical History:   Diagnosis Date    Coronary artery disease     GERD (gastroesophageal reflux disease)     Hypertension      Past Surgical History:   Procedure Laterality Date    CORONARY ARTERY BYPASS GRAFT      EYE SURGERY      HERNIA REPAIR      KNEE SURGERY      POSTERIOR FUSION LUMBAR SPINE W/ CORPECTOMY      SHOULDER SURGERY       Review of patient's allergies indicates:   Allergen Reactions    Morphine      Current Outpatient Medications on File Prior to Visit   Medication Sig Dispense Refill    alfuzosin (UROXATRAL) 10 mg Tb24 Take 10 mg by mouth daily with breakfast.      amoxicillin-clavulanate 875-125mg (AUGMENTIN) 875-125 mg per tablet Take 1 tablet by mouth 2 (two) times daily. for 10 days 20 tablet 0    aspirin (ECOTRIN) 81 MG EC tablet Take 1 tablet (81 mg total) by mouth once daily.  0    buPROPion (WELLBUTRIN SR) 150 MG TBSR 12 hr tablet Take 150 mg by mouth 2 (two) times daily.      carvedilol (COREG) 6.25 MG tablet Take 6.25 mg by mouth 2 (two) times daily with meals.      celecoxib (CELEBREX) 200 MG capsule Take 200 mg by mouth 2 (two) times daily.      chlorthalidone (HYGROTEN) 25 MG Tab Take 25 mg by mouth once daily.      clopidogrel (PLAVIX) 75 mg tablet Take 75 mg by mouth once daily.      duloxetine (CYMBALTA) 60 MG capsule Take 60 mg by mouth once daily.      fluticasone propionate (FLONASE) 50 mcg/actuation nasal spray 1 spray (50 mcg total) by Each Nostril route once daily. 16 g 0    irbesartan (AVAPRO) 300 MG tablet Take 300 mg by mouth every evening.      lansoprazole (PREVACID) 30 MG capsule Take 30 mg by mouth once daily.      levothyroxine (SYNTHROID) 50 MCG  tablet Take 50 mcg by mouth once daily.      mirabegron 50 mg Tb24 Take by mouth.      nitroGLYCERIN (NITROSTAT) 0.4 MG SL tablet Place 1 tablet (0.4 mg total) under the tongue every 5 (five) minutes as needed for Chest pain. 30 tablet 0    potassium chloride (MICRO-K) 10 MEQ CpSR Take 10 mEq by mouth once daily.      pregabalin (LYRICA) 100 MG capsule Take 100 mg by mouth 2 (two) times daily.      promethazine-dextromethorphan (PROMETHAZINE-DM) 6.25-15 mg/5 mL Syrp Take 5 mLs by mouth every 6 (six) hours as needed. 118 mL 0    rosuvastatin (CRESTOR) 40 MG Tab Take 1 tablet (40 mg total) by mouth once daily. 30 tablet 0     No current facility-administered medications on file prior to visit.     No family history on file.    Medications Ordered                [id number] - [place] [address] [facility] [address]  AT [facility] [address]   [address] [place] [address]    Telephone: [phone]   Fax: [phone]   Hours: Open 24 hours                         E-Prescribed (2 of 2)              albuterol (VENTOLIN HFA) 90 mcg/actuation inhaler    Sig: Inhale 2 puffs into the lungs every 6 (six) hours as needed for Wheezing or Shortness of Breath. Rescue       Start: 1/11/25     Quantity: 18 g Refills: 0                         dexAMETHasone (DECADRON) 6 MG tablet    Sig: Take 1 tablet (6 mg total) by mouth daily with breakfast. for 5 days       Start: 1/11/25     Quantity: 5 tablet Refills: 0                           Ohs Peq Odvv Intake    1/11/2025  7:55 PM CST - Filed by Patient   What is your current physical address in the event of a medical emergency? [address] [place] [address]   Are you able to take your vital signs? No   Please attach any relevant images or files    Is your employer contracted with [facility]? No         HPI     COVID-19 Concerns     Additional comments: Tested positive for covid 19.  Cough, fever, SOB with activity.  Oxygen sats  95%          Constitution: Positive for fatigue and fever.   HENT:  Positive for postnasal drip and sinus pressure.    Respiratory:  Positive for cough and shortness of breath. Negative for wheezing.         Objective:   The physical exam was conducted virtually.  Physical Exam   Constitutional: He is oriented to person, place, and time.  Non-toxic appearance. He appears ill. No distress.      Comments:elderly     HENT:   Head: Normocephalic and atraumatic.   Neck: Neck supple.   Pulmonary/Chest: Effort normal. No respiratory distress.   Musculoskeletal: Normal range of motion.         General: Normal range of motion.   Neurological: no focal deficit. He is alert and oriented to person, place, and time.   Skin: Skin is pale.   Psychiatric: His behavior is normal. Mood, judgment and thought content normal.       Assessment:     1. COVID-19        Plan:       COVID-19    Other orders  -     albuterol (VENTOLIN HFA) 90 mcg/actuation inhaler; Inhale 2 puffs into the lungs every 6 (six) hours as needed for Wheezing or Shortness of Breath. Rescue  Dispense: 18 g; Refill: 0  -     dexAMETHasone (DECADRON) 6 MG tablet; Take 1 tablet (6 mg total) by mouth daily with breakfast. for 5 days  Dispense: 5 tablet; Refill: 0      Paxlovid contraindicated due to plavix.  Monitor respiratory status closely.  If oxygen saturation dips below 90% or you begin feeling lightheaded, dizzy, have chest pain, find it difficult to catch your breath after ambulating to bathroom, develop swelling to one or both lower extremities or any other worrisome symptoms, proceed to the ER for further evaluation.    You must understand that you've received a virtual Care treatment only and that you may be released before all your medical problems are known or treated. You, the patient, will arrange for follow up care as instructed.  If your condition worsens we recommend that you receive another evaluation at an urgent care in person, the emergency room or  contact your primary medical clinics after hours call service to discuss your concerns.